# Patient Record
Sex: FEMALE | Race: WHITE | NOT HISPANIC OR LATINO | Employment: FULL TIME | URBAN - METROPOLITAN AREA
[De-identification: names, ages, dates, MRNs, and addresses within clinical notes are randomized per-mention and may not be internally consistent; named-entity substitution may affect disease eponyms.]

---

## 2023-02-16 PROBLEM — J20.9 ACUTE BRONCHITIS: Status: RESOLVED | Noted: 2023-02-16 | Resolved: 2023-02-16

## 2023-02-16 PROBLEM — R92.30 DENSE BREAST TISSUE ON MAMMOGRAM: Status: ACTIVE | Noted: 2023-02-16

## 2023-02-16 PROBLEM — R10.31 ABDOMINAL PAIN, RLQ: Status: RESOLVED | Noted: 2023-02-16 | Resolved: 2023-02-16

## 2023-02-16 PROBLEM — M79.671 RIGHT FOOT PAIN: Status: ACTIVE | Noted: 2023-02-16

## 2023-02-16 PROBLEM — K76.9 LIVER LESION: Status: RESOLVED | Noted: 2023-02-16 | Resolved: 2023-02-16

## 2023-02-16 PROBLEM — K58.9 IRRITABLE BOWEL SYNDROME: Status: ACTIVE | Noted: 2023-02-16

## 2023-02-16 PROBLEM — R92.8 ABNORMAL FINDING ON MAMMOGRAPHY: Status: RESOLVED | Noted: 2023-02-16 | Resolved: 2023-02-16

## 2023-02-16 PROBLEM — J06.9 ACUTE URI: Status: RESOLVED | Noted: 2023-02-16 | Resolved: 2023-02-16

## 2023-02-16 PROBLEM — R05.9 COUGH: Status: RESOLVED | Noted: 2023-02-16 | Resolved: 2023-02-16

## 2023-02-16 PROBLEM — R10.2 PELVIC PAIN: Status: RESOLVED | Noted: 2023-02-16 | Resolved: 2023-02-16

## 2023-02-16 PROBLEM — N92.1 MENOMETRORRHAGIA: Status: RESOLVED | Noted: 2023-02-16 | Resolved: 2023-02-16

## 2023-02-16 PROBLEM — M79.646 PAIN OF FINGER: Status: RESOLVED | Noted: 2023-02-16 | Resolved: 2023-02-16

## 2023-02-16 PROBLEM — N95.2 POSTMENOPAUSAL ATROPHIC VAGINITIS: Status: RESOLVED | Noted: 2023-02-16 | Resolved: 2023-02-16

## 2023-02-16 PROBLEM — N20.0 RENAL CALCULI: Status: RESOLVED | Noted: 2023-02-16 | Resolved: 2023-02-16

## 2023-02-16 PROBLEM — M70.62 TROCHANTERIC BURSITIS OF LEFT HIP: Status: ACTIVE | Noted: 2023-02-16

## 2023-02-16 PROBLEM — N92.1 IRREGULAR INTERMENSTRUAL BLEEDING: Status: RESOLVED | Noted: 2023-02-16 | Resolved: 2023-02-16

## 2023-02-16 PROBLEM — Z98.890 HISTORY OF BENIGN BREAST BIOPSY: Status: RESOLVED | Noted: 2023-02-16 | Resolved: 2023-02-16

## 2023-02-16 PROBLEM — M25.531 RIGHT WRIST PAIN: Status: RESOLVED | Noted: 2023-02-16 | Resolved: 2023-02-16

## 2023-02-16 PROBLEM — J06.9 ACUTE RESPIRATORY DISEASE: Status: RESOLVED | Noted: 2023-02-16 | Resolved: 2023-02-16

## 2023-02-16 PROBLEM — J02.9 PHARYNGITIS: Status: RESOLVED | Noted: 2023-02-16 | Resolved: 2023-02-16

## 2023-02-16 PROBLEM — M79.646 THUMB PAIN: Status: RESOLVED | Noted: 2023-02-16 | Resolved: 2023-02-16

## 2023-02-16 PROBLEM — M41.9 SCOLIOSIS: Status: ACTIVE | Noted: 2023-02-16

## 2023-02-16 PROBLEM — J30.9 ALLERGIC RHINITIS: Status: ACTIVE | Noted: 2023-02-16

## 2023-02-16 PROBLEM — R10.9 ABDOMINAL PAIN: Status: RESOLVED | Noted: 2023-02-16 | Resolved: 2023-02-16

## 2023-02-16 PROBLEM — N92.6 MISSED MENSES: Status: RESOLVED | Noted: 2023-02-16 | Resolved: 2023-02-16

## 2023-02-16 PROBLEM — E55.9 VITAMIN D DEFICIENCY: Status: ACTIVE | Noted: 2023-02-16

## 2023-02-16 PROBLEM — K59.00 CONSTIPATION: Status: RESOLVED | Noted: 2023-02-16 | Resolved: 2023-02-16

## 2023-02-16 PROBLEM — R30.0 DYSURIA: Status: RESOLVED | Noted: 2023-02-16 | Resolved: 2023-02-16

## 2023-02-16 PROBLEM — E78.00 HYPERCHOLESTEREMIA: Status: RESOLVED | Noted: 2023-02-16 | Resolved: 2023-02-16

## 2023-02-16 PROBLEM — R31.9 HEMATURIA: Status: RESOLVED | Noted: 2023-02-16 | Resolved: 2023-02-16

## 2023-02-16 PROBLEM — L30.9 DERMATITIS: Status: RESOLVED | Noted: 2023-02-16 | Resolved: 2023-02-16

## 2023-02-16 PROBLEM — S93.409A GRADE 2 ANKLE SPRAIN: Status: RESOLVED | Noted: 2023-02-16 | Resolved: 2023-02-16

## 2023-02-16 PROBLEM — L20.9 ATOPIC DERMATITIS: Status: RESOLVED | Noted: 2023-02-16 | Resolved: 2023-02-16

## 2023-02-16 PROBLEM — R31.29 MICROSCOPIC HEMATURIA: Status: RESOLVED | Noted: 2023-02-16 | Resolved: 2023-02-16

## 2023-02-16 PROBLEM — N95.1 HOT FLUSHES, PERIMENOPAUSAL: Status: ACTIVE | Noted: 2023-02-16

## 2023-02-16 PROBLEM — M25.562 LEFT KNEE PAIN: Status: RESOLVED | Noted: 2023-02-16 | Resolved: 2023-02-16

## 2023-02-16 PROBLEM — N93.9 ABNORMAL UTERINE BLEEDING (AUB): Status: RESOLVED | Noted: 2023-02-16 | Resolved: 2023-02-16

## 2023-02-16 PROBLEM — R50.9 FEVER OF UNKNOWN ORIGIN: Status: RESOLVED | Noted: 2023-02-16 | Resolved: 2023-02-16

## 2023-02-16 PROBLEM — B37.9 YEAST INFECTION: Status: RESOLVED | Noted: 2023-02-16 | Resolved: 2023-02-16

## 2023-02-16 PROBLEM — M25.561 RIGHT KNEE PAIN: Status: RESOLVED | Noted: 2023-02-16 | Resolved: 2023-02-16

## 2023-02-16 RX ORDER — CHOLECALCIFEROL (VITAMIN D3) 50 MCG
2000 TABLET ORAL DAILY
COMMUNITY

## 2023-03-30 LAB
ALANINE AMINOTRANSFERASE (SGPT) (U/L) IN SER/PLAS: 23 U/L (ref 7–45)
ALBUMIN (G/DL) IN SER/PLAS: 4.3 G/DL (ref 3.4–5)
ALKALINE PHOSPHATASE (U/L) IN SER/PLAS: 105 U/L (ref 33–110)
ANION GAP IN SER/PLAS: 13 MMOL/L (ref 10–20)
ASPARTATE AMINOTRANSFERASE (SGOT) (U/L) IN SER/PLAS: 19 U/L (ref 9–39)
BASOPHILS (10*3/UL) IN BLOOD BY AUTOMATED COUNT: 0.03 X10E9/L (ref 0–0.1)
BASOPHILS/100 LEUKOCYTES IN BLOOD BY AUTOMATED COUNT: 0.8 % (ref 0–2)
BILIRUBIN TOTAL (MG/DL) IN SER/PLAS: 0.5 MG/DL (ref 0–1.2)
CALCIDIOL (25 OH VITAMIN D3) (NG/ML) IN SER/PLAS: 32 NG/ML
CALCIUM (MG/DL) IN SER/PLAS: 9.3 MG/DL (ref 8.6–10.6)
CARBON DIOXIDE, TOTAL (MMOL/L) IN SER/PLAS: 29 MMOL/L (ref 21–32)
CHLORIDE (MMOL/L) IN SER/PLAS: 102 MMOL/L (ref 98–107)
CHOLESTEROL (MG/DL) IN SER/PLAS: 250 MG/DL (ref 0–199)
CHOLESTEROL IN HDL (MG/DL) IN SER/PLAS: 50.2 MG/DL
CHOLESTEROL/HDL RATIO: 5
CREATININE (MG/DL) IN SER/PLAS: 0.72 MG/DL (ref 0.5–1.05)
EOSINOPHILS (10*3/UL) IN BLOOD BY AUTOMATED COUNT: 0.11 X10E9/L (ref 0–0.7)
EOSINOPHILS/100 LEUKOCYTES IN BLOOD BY AUTOMATED COUNT: 2.9 % (ref 0–6)
ERYTHROCYTE DISTRIBUTION WIDTH (RATIO) BY AUTOMATED COUNT: 12.8 % (ref 11.5–14.5)
ERYTHROCYTE MEAN CORPUSCULAR HEMOGLOBIN CONCENTRATION (G/DL) BY AUTOMATED: 31.4 G/DL (ref 32–36)
ERYTHROCYTE MEAN CORPUSCULAR VOLUME (FL) BY AUTOMATED COUNT: 92 FL (ref 80–100)
ERYTHROCYTES (10*6/UL) IN BLOOD BY AUTOMATED COUNT: 4.73 X10E12/L (ref 4–5.2)
ESTIMATED AVERAGE GLUCOSE FOR HBA1C: 103 MG/DL
GFR FEMALE: >90 ML/MIN/1.73M2
GLUCOSE (MG/DL) IN SER/PLAS: 87 MG/DL (ref 74–99)
HEMATOCRIT (%) IN BLOOD BY AUTOMATED COUNT: 43.3 % (ref 36–46)
HEMOGLOBIN (G/DL) IN BLOOD: 13.6 G/DL (ref 12–16)
HEMOGLOBIN A1C/HEMOGLOBIN TOTAL IN BLOOD: 5.2 %
IMMATURE GRANULOCYTES/100 LEUKOCYTES IN BLOOD BY AUTOMATED COUNT: 0.5 % (ref 0–0.9)
LDL: 169 MG/DL (ref 0–99)
LEUKOCYTES (10*3/UL) IN BLOOD BY AUTOMATED COUNT: 3.9 X10E9/L (ref 4.4–11.3)
LYMPHOCYTES (10*3/UL) IN BLOOD BY AUTOMATED COUNT: 1.48 X10E9/L (ref 1.2–4.8)
LYMPHOCYTES/100 LEUKOCYTES IN BLOOD BY AUTOMATED COUNT: 38.4 % (ref 13–44)
MONOCYTES (10*3/UL) IN BLOOD BY AUTOMATED COUNT: 0.3 X10E9/L (ref 0.1–1)
MONOCYTES/100 LEUKOCYTES IN BLOOD BY AUTOMATED COUNT: 7.8 % (ref 2–10)
NEUTROPHILS (10*3/UL) IN BLOOD BY AUTOMATED COUNT: 1.91 X10E9/L (ref 1.2–7.7)
NEUTROPHILS/100 LEUKOCYTES IN BLOOD BY AUTOMATED COUNT: 49.6 % (ref 40–80)
NRBC (PER 100 WBCS) BY AUTOMATED COUNT: 0 /100 WBC (ref 0–0)
PLATELETS (10*3/UL) IN BLOOD AUTOMATED COUNT: 279 X10E9/L (ref 150–450)
POTASSIUM (MMOL/L) IN SER/PLAS: 4.3 MMOL/L (ref 3.5–5.3)
PROTEIN TOTAL: 7.2 G/DL (ref 6.4–8.2)
SODIUM (MMOL/L) IN SER/PLAS: 140 MMOL/L (ref 136–145)
THYROTROPIN (MIU/L) IN SER/PLAS BY DETECTION LIMIT <= 0.05 MIU/L: 2.1 MIU/L (ref 0.44–3.98)
TRIGLYCERIDE (MG/DL) IN SER/PLAS: 154 MG/DL (ref 0–149)
UREA NITROGEN (MG/DL) IN SER/PLAS: 13 MG/DL (ref 6–23)
VLDL: 31 MG/DL (ref 0–40)

## 2023-04-01 ASSESSMENT — PROMIS GLOBAL HEALTH SCALE
RATE_QUALITY_OF_LIFE: GOOD
CARRYOUT_PHYSICAL_ACTIVITIES: COMPLETELY
RATE_MENTAL_HEALTH: GOOD
CARRYOUT_SOCIAL_ACTIVITIES: VERY GOOD
RATE_GENERAL_HEALTH: GOOD
EMOTIONAL_PROBLEMS: OFTEN
RATE_PHYSICAL_HEALTH: GOOD
RATE_AVERAGE_PAIN: 1
RATE_SOCIAL_SATISFACTION: GOOD
RATE_AVERAGE_FATIGUE: MODERATE

## 2023-04-02 PROBLEM — E66.3 OVERWEIGHT WITH BODY MASS INDEX (BMI) OF 29 TO 29.9 IN ADULT: Status: ACTIVE | Noted: 2023-04-02

## 2023-04-02 PROBLEM — R39.9 UTI SYMPTOMS: Status: ACTIVE | Noted: 2023-04-02

## 2023-04-02 PROBLEM — N64.4 BREAST PAIN IN FEMALE: Status: ACTIVE | Noted: 2023-04-02

## 2023-04-02 PROBLEM — R03.0 ELEVATED BLOOD PRESSURE READING: Status: ACTIVE | Noted: 2023-04-02

## 2023-04-02 PROBLEM — F41.8 SITUATIONAL ANXIETY: Status: ACTIVE | Noted: 2023-04-02

## 2023-04-03 ENCOUNTER — OFFICE VISIT (OUTPATIENT)
Dept: PRIMARY CARE | Facility: CLINIC | Age: 53
End: 2023-04-03
Payer: COMMERCIAL

## 2023-04-03 VITALS
HEIGHT: 64 IN | DIASTOLIC BLOOD PRESSURE: 86 MMHG | SYSTOLIC BLOOD PRESSURE: 136 MMHG | RESPIRATION RATE: 16 BRPM | TEMPERATURE: 98.2 F | BODY MASS INDEX: 28.07 KG/M2 | WEIGHT: 164.4 LBS

## 2023-04-03 DIAGNOSIS — E78.00 ELEVATED CHOLESTEROL: ICD-10-CM

## 2023-04-03 DIAGNOSIS — R03.0 ELEVATED BLOOD PRESSURE READING: ICD-10-CM

## 2023-04-03 DIAGNOSIS — Z00.00 ENCOUNTER FOR ANNUAL PHYSICAL EXAM: Primary | ICD-10-CM

## 2023-04-03 DIAGNOSIS — D72.819 LEUKOPENIA, UNSPECIFIED TYPE: ICD-10-CM

## 2023-04-03 DIAGNOSIS — K21.9 GASTROESOPHAGEAL REFLUX DISEASE WITHOUT ESOPHAGITIS: ICD-10-CM

## 2023-04-03 PROCEDURE — 1036F TOBACCO NON-USER: CPT | Performed by: FAMILY MEDICINE

## 2023-04-03 PROCEDURE — 99396 PREV VISIT EST AGE 40-64: CPT | Performed by: FAMILY MEDICINE

## 2023-04-03 NOTE — PROGRESS NOTES
"Subjective   Evi Higgins is a 52 y.o. female who presents for Annual Exam (PT here for CPE ).  HPI  PMH:  NIL neg HPV 3/2022  benign colon polyps 7/2016 rpt 10 yr  mammo-gyn  Shingrix UTD   dtr [facility]     Not ex reguarly d/t going back and forth to [place], dad w CHF hospice    BP much better in the last mo in the 110-130/70-80s. Working on stress mgnt techniques    Upper chest pain after eating no pain w ex    R occ foot pain    Consider vaginal estrogen since sex is painful, lubricant helped some     Current Outpatient Medications on File Prior to Visit   Medication Sig Dispense Refill    cholecalciferol (Vitamin D-3) 50 MCG (2000 UT) tablet Take 1 tablet (2,000 Units) by mouth in the morning.       No current facility-administered medications on file prior to visit.       Objective   /86   Temp 36.8 °C (98.2 °F)   Resp 16   Ht 1.633 m (5' 4.29\")   Wt 74.6 kg (164 lb 6.4 oz)   BMI 27.96 kg/m²    Physical Exam  General: NAD  HEENT:NCAT, PERRLA, nml OP, b/l TM clear, patent nares   Neck: no cervical TOSHIA  Heart: RRR no murmur, no edema   Lungs: CTA b/l, no wheeze or rhonchi   GI: abd soft, nontender, nondistended.   Breast: symmetric, no masses, rashes, lesions, axillary TOSHIA  MSK: no c/c/e. nml gait   Mild pain R outer foot   Skin: warm and dry  Psych: cooperative, appropriate affect  Neuro: speech clear. A&Ox3  Assessment/Plan   Problem List Items Addressed This Visit          Circulatory    Elevated blood pressure reading  -reviewed home readings, at goal. Meds not necessary      Other Visit Diagnoses       Leukopenia, unspecified type    -  Primary  Rpt CBC in 6 wk     Relevant Orders    CBC and Auto Differential    Elevated cholesterol      -ASCVD 2.4 Ca score 0 4/2022  -although lipids have elevated, statin not indicated.   -lifestyle changes  -rpt in 6 mo     Relevant Orders    Lipid Panel    Gastroesophageal reflux disease without esophagitis      -rec OTC PPI and or tums  -red flag signs and return " precautions discussed.       Encounter for annual physical exam    CPE:overall doing well. Discussed diet/ex changes  BMI: 27-lifestyle changes  VACC: reviewed, tdap due this mo will do at f/up appt unless injury occurs. Shingrixx UTD, flu/covid in fall   COLON CA SCRN: due 2026   LABS: reviewed w pt   PAP: UTD rpt 2027   MAMMO: UTD  DEXA: never      R ankle pain: likely d/t previous sprains, rec brace/strengthening

## 2023-05-15 ENCOUNTER — OFFICE VISIT (OUTPATIENT)
Dept: PRIMARY CARE | Facility: CLINIC | Age: 53
End: 2023-05-15
Payer: COMMERCIAL

## 2023-05-15 ENCOUNTER — TELEPHONE (OUTPATIENT)
Dept: PRIMARY CARE | Facility: CLINIC | Age: 53
End: 2023-05-15

## 2023-05-15 VITALS
TEMPERATURE: 99.1 F | OXYGEN SATURATION: 98 % | RESPIRATION RATE: 18 BRPM | SYSTOLIC BLOOD PRESSURE: 132 MMHG | BODY MASS INDEX: 27.93 KG/M2 | DIASTOLIC BLOOD PRESSURE: 81 MMHG | HEART RATE: 60 BPM | WEIGHT: 164.2 LBS

## 2023-05-15 DIAGNOSIS — R35.0 URINARY FREQUENCY: Primary | ICD-10-CM

## 2023-05-15 DIAGNOSIS — J20.8 ACUTE BACTERIAL BRONCHITIS: ICD-10-CM

## 2023-05-15 DIAGNOSIS — B96.89 ACUTE BACTERIAL BRONCHITIS: ICD-10-CM

## 2023-05-15 LAB
POC APPEARANCE, URINE: CLEAR
POC BILIRUBIN, URINE: ABNORMAL
POC BLOOD, URINE: ABNORMAL
POC COLOR, URINE: ABNORMAL
POC GLUCOSE, URINE: NEGATIVE MG/DL
POC KETONES, URINE: NEGATIVE MG/DL
POC LEUKOCYTES, URINE: NEGATIVE
POC NITRITE,URINE: NEGATIVE
POC PH, URINE: 5 PH
POC PROTEIN, URINE: NEGATIVE MG/DL
POC SPECIFIC GRAVITY, URINE: >=1.03
POC UROBILINOGEN, URINE: 0.2 EU/DL

## 2023-05-15 PROCEDURE — 87086 URINE CULTURE/COLONY COUNT: CPT

## 2023-05-15 PROCEDURE — 1036F TOBACCO NON-USER: CPT | Performed by: FAMILY MEDICINE

## 2023-05-15 PROCEDURE — 81002 URINALYSIS NONAUTO W/O SCOPE: CPT | Performed by: FAMILY MEDICINE

## 2023-05-15 PROCEDURE — 99214 OFFICE O/P EST MOD 30 MIN: CPT | Performed by: FAMILY MEDICINE

## 2023-05-15 RX ORDER — AZITHROMYCIN 250 MG/1
TABLET, FILM COATED ORAL
Qty: 6 TABLET | Refills: 0 | Status: SHIPPED | OUTPATIENT
Start: 2023-05-15 | End: 2023-05-20

## 2023-05-15 RX ORDER — AMOXICILLIN 500 MG/1
CAPSULE ORAL
COMMUNITY
Start: 2023-05-09 | End: 2023-10-11 | Stop reason: ALTCHOICE

## 2023-05-15 NOTE — TELEPHONE ENCOUNTER
Patient called in sts she just got back from [place] was seen at the  there and was tested for Flu and covid both negative, on amoxicillin sts she doesn't think its working her chest is feeling heavy possible walking Pneumonia has had before low grade fever wants to be seen today? Please advise

## 2023-05-15 NOTE — PROGRESS NOTES
"Subjective   Evi Higgins is a 52 y.o. female who presents for URI (Pt experiencing cold symptoms; [facility] tested for Flu and covid both negative, on amoxicillin; heaviness in chest; low grade fever).  HPI  On amoxil x 1 wk for sinusitis, started in [place] when out of town. Hx walking PNA x 2 in life. Cough is \"wet\" no wheeze or sob. Occ chest hurting and feels hot. No runny nose. Occ ear pressure. Temp around 99 but \"feels hot\".     Has inc urinary freq after driving 12 hr from [place]. No itching. No DC. No dysuria. Slightly better today  Current Outpatient Medications on File Prior to Visit   Medication Sig Dispense Refill    amoxicillin (Amoxil) 500 mg capsule TAKE 1 CAPSULE (500 MG TOTAL) BY MOUTH EVERY 12 HOURS FOR 7 DAYS      cholecalciferol (Vitamin D-3) 50 MCG (2000 UT) tablet Take 1 tablet (2,000 Units) by mouth in the morning.       No current facility-administered medications on file prior to visit.                  Objective   /81   Pulse 60   Temp 37.3 °C (99.1 °F)   Resp 18   Wt 74.5 kg (164 lb 3.2 oz)   SpO2 98%   BMI 27.93 kg/m²    Physical Exam  General: NAD  HEENT:NCAT, PERRLA, nml OP  Neck: no cervical TOSHIA  Heart: RRR no murmur, no edema   Lungs: CTA b/l, no wheeze or rhonchi   GI: abd soft, nontender, nondistended.   MSK: no c/c/e. nml gait   Skin: warm and dry  Psych: cooperative, appropriate affect  Neuro: speech clear. A&Ox3  Assessment/Plan   Problem List Items Addressed This Visit    None  Visit Diagnoses       Urinary frequency    -  Primary  -blood in urine noted. Likely chronic benign micro hematuria. Saw uro in 2021 w some w/up. Never had cyst  -check micro and culture given her symptoms   -CT 2021 reviewed, no stones   Results for orders placed or performed in visit on 05/15/23 (from the past 24 hour(s))   POCT UA (nonautomated) manually resulted   Result Value Ref Range    POC Color, Urine Straw Straw, Yellow, Light Yellow    POC Appearance, Urine Clear Clear    POC Specific " Gravity, Urine >=1.030 1.005 - 1.035    POC PH, Urine 5.0 No Reference Range Established PH    POC Protein, Urine NEGATIVE NEGATIVE, 30 (1+) mg/dl    POC Glucose, Urine NEGATIVE NEGATIVE mg/dl    POC Blood, Urine MODERATE (2+) (A) NEGATIVE    POC Ketones, Urine NEGATIVE NEGATIVE mg/dl    POC Bilirubin, Urine SMALL (1+) (A) NEGATIVE    POC Urobilinogen, Urine 0.2 0.2, 1.0 EU/DL    Poc Nitrate, Urine NEGATIVE NEGATIVE    POC Leukocytes, Urine NEGATIVE NEGATIVE         Relevant Orders    Urinalysis with Reflex Microscopic and Culture    Urine Culture    POCT UA (nonautomated) manually resulted    Acute bacterial bronchitis      -failed amoxil  -start zpack  -if no improvement CXR w medrol     Relevant Medications    azithromycin (Zithromax) 250 mg tablet

## 2023-05-16 LAB — URINE CULTURE: NORMAL

## 2023-05-17 ENCOUNTER — TELEPHONE (OUTPATIENT)
Dept: PRIMARY CARE | Facility: CLINIC | Age: 53
End: 2023-05-17
Payer: COMMERCIAL

## 2023-05-17 NOTE — TELEPHONE ENCOUNTER
Patient called and sts that her Lab orders are still not covered properly do to the correct coding her Vit D was fixed  but not her Thyroid still needs to be re-coded? Please call her she also sts that her daughters labs from lat yr 2022 need to be done as well

## 2023-07-10 ENCOUNTER — APPOINTMENT (OUTPATIENT)
Dept: LAB | Facility: LAB | Age: 53
End: 2023-07-10
Payer: COMMERCIAL

## 2023-07-10 LAB
BASOPHILS (10*3/UL) IN BLOOD BY AUTOMATED COUNT: 0.04 X10E9/L (ref 0–0.1)
BASOPHILS/100 LEUKOCYTES IN BLOOD BY AUTOMATED COUNT: 0.7 % (ref 0–2)
EOSINOPHILS (10*3/UL) IN BLOOD BY AUTOMATED COUNT: 0.06 X10E9/L (ref 0–0.7)
EOSINOPHILS/100 LEUKOCYTES IN BLOOD BY AUTOMATED COUNT: 1.1 % (ref 0–6)
ERYTHROCYTE DISTRIBUTION WIDTH (RATIO) BY AUTOMATED COUNT: 13.1 % (ref 11.5–14.5)
ERYTHROCYTE MEAN CORPUSCULAR HEMOGLOBIN CONCENTRATION (G/DL) BY AUTOMATED: 32.7 G/DL (ref 32–36)
ERYTHROCYTE MEAN CORPUSCULAR VOLUME (FL) BY AUTOMATED COUNT: 90 FL (ref 80–100)
ERYTHROCYTES (10*6/UL) IN BLOOD BY AUTOMATED COUNT: 4.31 X10E12/L (ref 4–5.2)
HEMATOCRIT (%) IN BLOOD BY AUTOMATED COUNT: 38.8 % (ref 36–46)
HEMOGLOBIN (G/DL) IN BLOOD: 12.7 G/DL (ref 12–16)
IMMATURE GRANULOCYTES/100 LEUKOCYTES IN BLOOD BY AUTOMATED COUNT: 0.5 % (ref 0–0.9)
LEUKOCYTES (10*3/UL) IN BLOOD BY AUTOMATED COUNT: 5.5 X10E9/L (ref 4.4–11.3)
LYMPHOCYTES (10*3/UL) IN BLOOD BY AUTOMATED COUNT: 1.74 X10E9/L (ref 1.2–4.8)
LYMPHOCYTES/100 LEUKOCYTES IN BLOOD BY AUTOMATED COUNT: 31.6 % (ref 13–44)
MONOCYTES (10*3/UL) IN BLOOD BY AUTOMATED COUNT: 0.4 X10E9/L (ref 0.1–1)
MONOCYTES/100 LEUKOCYTES IN BLOOD BY AUTOMATED COUNT: 7.3 % (ref 2–10)
NEUTROPHILS (10*3/UL) IN BLOOD BY AUTOMATED COUNT: 3.23 X10E9/L (ref 1.2–7.7)
NEUTROPHILS/100 LEUKOCYTES IN BLOOD BY AUTOMATED COUNT: 58.8 % (ref 40–80)
NRBC (PER 100 WBCS) BY AUTOMATED COUNT: 0 /100 WBC (ref 0–0)
PLATELETS (10*3/UL) IN BLOOD AUTOMATED COUNT: 281 X10E9/L (ref 150–450)

## 2023-10-07 ENCOUNTER — LAB (OUTPATIENT)
Dept: LAB | Facility: LAB | Age: 53
End: 2023-10-07
Payer: COMMERCIAL

## 2023-10-07 DIAGNOSIS — D72.819 LEUKOPENIA, UNSPECIFIED TYPE: ICD-10-CM

## 2023-10-07 LAB
BASOPHILS # BLD AUTO: 0.03 X10*3/UL (ref 0–0.1)
BASOPHILS NFR BLD AUTO: 0.7 %
EOSINOPHIL # BLD AUTO: 0.11 X10*3/UL (ref 0–0.7)
EOSINOPHIL NFR BLD AUTO: 2.5 %
ERYTHROCYTE [DISTWIDTH] IN BLOOD BY AUTOMATED COUNT: 12.6 % (ref 11.5–14.5)
HCT VFR BLD AUTO: 43.2 % (ref 36–46)
HGB BLD-MCNC: 13.7 G/DL (ref 12–16)
IMM GRANULOCYTES # BLD AUTO: 0.04 X10*3/UL (ref 0–0.7)
IMM GRANULOCYTES NFR BLD AUTO: 0.9 % (ref 0–0.9)
LYMPHOCYTES # BLD AUTO: 1.63 X10*3/UL (ref 1.2–4.8)
LYMPHOCYTES NFR BLD AUTO: 36.7 %
MCH RBC QN AUTO: 29.3 PG (ref 26–34)
MCHC RBC AUTO-ENTMCNC: 31.7 G/DL (ref 32–36)
MCV RBC AUTO: 93 FL (ref 80–100)
MONOCYTES # BLD AUTO: 0.32 X10*3/UL (ref 0.1–1)
MONOCYTES NFR BLD AUTO: 7.2 %
NEUTROPHILS # BLD AUTO: 2.31 X10*3/UL (ref 1.2–7.7)
NEUTROPHILS NFR BLD AUTO: 52 %
NRBC BLD-RTO: 0 /100 WBCS (ref 0–0)
PLATELET # BLD AUTO: 269 X10*3/UL (ref 150–450)
PMV BLD AUTO: 11.7 FL (ref 7.5–11.5)
RBC # BLD AUTO: 4.67 X10*6/UL (ref 4–5.2)
WBC # BLD AUTO: 4.4 X10*3/UL (ref 4.4–11.3)

## 2023-10-07 PROCEDURE — 36415 COLL VENOUS BLD VENIPUNCTURE: CPT

## 2023-10-07 PROCEDURE — 85025 COMPLETE CBC W/AUTO DIFF WBC: CPT

## 2023-10-11 ENCOUNTER — LAB (OUTPATIENT)
Dept: LAB | Facility: LAB | Age: 53
End: 2023-10-11
Payer: COMMERCIAL

## 2023-10-11 ENCOUNTER — TELEPHONE (OUTPATIENT)
Dept: PRIMARY CARE | Facility: CLINIC | Age: 53
End: 2023-10-11

## 2023-10-11 ENCOUNTER — OFFICE VISIT (OUTPATIENT)
Dept: PRIMARY CARE | Facility: CLINIC | Age: 53
End: 2023-10-11
Payer: COMMERCIAL

## 2023-10-11 VITALS
OXYGEN SATURATION: 96 % | DIASTOLIC BLOOD PRESSURE: 86 MMHG | TEMPERATURE: 98.2 F | BODY MASS INDEX: 27.49 KG/M2 | HEIGHT: 64 IN | SYSTOLIC BLOOD PRESSURE: 131 MMHG | WEIGHT: 161 LBS | RESPIRATION RATE: 18 BRPM | HEART RATE: 61 BPM

## 2023-10-11 DIAGNOSIS — Z23 NEED FOR DIPHTHERIA-TETANUS-PERTUSSIS (TDAP) VACCINE: ICD-10-CM

## 2023-10-11 DIAGNOSIS — Z00.00 ANNUAL PHYSICAL EXAM: ICD-10-CM

## 2023-10-11 DIAGNOSIS — E78.2 MIXED HYPERLIPIDEMIA: Primary | ICD-10-CM

## 2023-10-11 LAB
CHOLEST SERPL-MCNC: 239 MG/DL (ref 0–199)
CHOLESTEROL/HDL RATIO: 4.6
HDLC SERPL-MCNC: 52.2 MG/DL
LDLC SERPL CALC-MCNC: 155 MG/DL (ref 140–190)
NON HDL CHOLESTEROL: 187 MG/DL (ref 0–149)
TRIGL SERPL-MCNC: 161 MG/DL (ref 0–149)
VLDL: 32 MG/DL (ref 0–40)

## 2023-10-11 PROCEDURE — 90471 IMMUNIZATION ADMIN: CPT | Performed by: FAMILY MEDICINE

## 2023-10-11 PROCEDURE — 80061 LIPID PANEL: CPT

## 2023-10-11 PROCEDURE — 99213 OFFICE O/P EST LOW 20 MIN: CPT | Performed by: FAMILY MEDICINE

## 2023-10-11 PROCEDURE — 90715 TDAP VACCINE 7 YRS/> IM: CPT | Performed by: FAMILY MEDICINE

## 2023-10-11 PROCEDURE — 36415 COLL VENOUS BLD VENIPUNCTURE: CPT

## 2023-10-11 PROCEDURE — 1036F TOBACCO NON-USER: CPT | Performed by: FAMILY MEDICINE

## 2023-10-11 NOTE — TELEPHONE ENCOUNTER
Her WBC is improved which was what I wanted to see  Other minor changes are not clinically significant and do not warrant further eval since her other major blood values (hemogloblin, WBC and platelets are normal)

## 2023-10-11 NOTE — TELEPHONE ENCOUNTER
Patient notified of positive UA culture, E.coli,  results.Name and  verified. Advised patient to continues taking cephalexin as prescribed. Patient verbalized understanding of results and to follow up with PCP if necessary.     Component      Latest Ref Rng & Units 10/26/2022   REFLEXIVE URINE CULTURE       >100,000 CFU/mL Escherichia coli (A)      VM:  pt returned your call 2:15pm

## 2023-10-11 NOTE — PROGRESS NOTES
"Subjective   Evi Higgins is a 52 y.o. female who presents for Follow-up (Six month follow up 6 mo HLD, tdap, with labs).  HPI  Here for f/up   Dad passed in Aug and was able to be w him in [place] on and off for 8 mo. Doing well overall but have waves of grief.     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 Walking. Appetite has inc and gained a few lbs.   Back to sub teaching     Current Outpatient Medications on File Prior to Visit   Medication Sig Dispense Refill    cholecalciferol (Vitamin D-3) 50 MCG (2000 UT) tablet Take 1 tablet (2,000 Units) by mouth in the morning.      [DISCONTINUED] amoxicillin (Amoxil) 500 mg capsule TAKE 1 CAPSULE (500 MG TOTAL) BY MOUTH EVERY 12 HOURS FOR 7 DAYS       No current facility-administered medications on file prior to visit.                  Objective   /86   Pulse 61   Temp 36.8 °C (98.2 °F)   Resp 18   Ht 1.633 m (5' 4.29\")   Wt 73 kg (161 lb)   SpO2 96%   BMI 27.39 kg/m²    Physical Exam  General: NAD  HEENT:NCAT, PERRLA, nml OP  Neck: no cervical TOSHIA  Heart: RRR no murmur, no edema   Lungs: CTA b/l, no wheeze or rhonchi   GI: abd soft, nontender, nondistended.   MSK: no c/c/e. nml gait   Skin: warm and dry  Psych: cooperative, appropriate affect  Neuro: speech clear. A&Ox3  Assessment/Plan   Problem List Items Addressed This Visit    None  Visit Diagnoses       Mixed hyperlipidemia    -  Primary  -borderline lipids from April   -has been making lifestyle changes but in the last few weeks less diligent  -rpt FLP today  -Ca score 0 2022 and ASCVD 2.4  -unless a significant inc in lipids will hold off on statin   -f/up CPE 6 mo or prn   -tdap today  -flu/covid at pharm     Need for diphtheria-tetanus-pertussis (Tdap) vaccine        Relevant Orders    Tdap vaccine, age 7 years and older (Completed)    Annual physical exam        Relevant Orders    Lipid Panel            "

## 2023-10-31 ENCOUNTER — APPOINTMENT (OUTPATIENT)
Dept: PRIMARY CARE | Facility: CLINIC | Age: 53
End: 2023-10-31
Payer: COMMERCIAL

## 2024-03-25 NOTE — PROGRESS NOTES
PAP 3-7-22 NEG, HPV NEG  MAMMO 3-29-23  DEXA YRS AGO  COLON 16 Normal - 10 yrs.      ASSESSMENT/PLAN    Encounter for well woman exam with routine gynecological exam  Normal routine well woman exam.   No pap done. Last pap  was normal.    Encounter for screening mammogram for breast cancer  - BI mammo bilateral screening tomosynthesis; Future     Screening for osteoporosis.  Bone density requisition placed.    SUBJECTIVE    HPI    52-year-old here for routine well woman visit.   Last visit 2023 for breast symptoms.  Menopausal for two years.   Asks about intravaginal estrogen. Friends are using it. We talked about vaginal lubricants in past. Pt never tried. We reviewed intravaginal estrogen options.   Dad  August after a long illness. She was going back and forth to [place]. Pt feels like she had time to spend with him and say her goodbyes.  Pt doing well. Exercising boot camp 4 times/week.   , no smoke, no ETOH.  2 kids. One is a Sr at [employer], graduating this year. . Other one working at [employer], plans grad school at [employer].       Review of Systems    Constitutional: no fever, no chills, no recent weight gain, no recent weight loss and no fatigue.   Eyes: no eye pain, no vision problems and no dryness of the eyes.   ENT: no hearing loss, no nosebleeds and no sinus congestion.   Cardiovascular: no chest pain, no palpitations and no orthopnea.   Respiratory: no shortness of breath, no cough and no wheezing.   Gastrointestinal: no abdominal pain, no constipation, no nausea, no diarrhea and no vomiting.   Genitourinary: no pelvic pain, no dysuria, no urinary incontinence, no vaginal dryness, no vaginal itching, no dyspareunia, no dysmenorrhea, no sexual problems, no change in urinary frequency, no vaginal discharge, no unexplained vaginal bleeding and no lesion/sore.   Musculoskeletal: no back pain, no joint swelling and no leg edema.   Integumentary: no rashes, no skin lesions, no breast pain, no  "nipple discharge and no breast lump.   Neurological: no headache, no numbness and no dizziness.   Psychiatric: no sleep disturbances, no anxiety and no depression.   Endocrine: no hot flashes, no loss of hair and no hirsutism.   Hematologic/Lymphatic: no swollen glands, no tendency for easy bleeding and no tendency for easy bruising.      OBJECTIVE    /70   Ht 1.651 m (5' 5\")   Wt 74.4 kg (164 lb)   LMP 04/01/2022   BMI 27.29 kg/m²      Physical Exam     Constitutional: Alert and in no acute distress. Well developed, well nourished   Head and Face: Head and face: normal   Eyes: Normal external exam - nonicteric sclera, extraocular movements intact (EOMI) and no ptosis.   Ears, Nose, Mouth, and Throat: External inspection of ears and nose: normal   Neck: no neck asymmetry. Supple and thyroid not enlarged and there were no palpable thyroid nodules   Cardiovascular: Heart rate and rhythm were normal, normal S1 and S2, no gallops, and no murmurs   Pulmonary: No respiratory distress and clear bilateral breath sounds   Chest: Breasts: normal appearance, no nipple discharge and no skin changes and palpation of breasts and axillae: no palpable mass and no axillary lymphadenopathy   Abdomen: soft nontender; no abdominal mass palpated, no organomegaly and no hernias   Genitourinary: external genitalia: normal, no inguinal lymphadenopathy, Bartholin's urethral and [place]'s glands: normal, urethra: normal, bladder: normal on palpation and perianal area: normal   Vagina: normal.   Cervix: Normal appearing without lesions.  Uterus: Normal, mobile, nontender.  Right Adnexa/parametria: Normal.   Left Adnexa/parametria: Normal.   Skin: normal skin color and pigmentation, normal skin turgor, and no rash.   Psychiatric: alert and oriented x 3., affect normal to patient baseline and mood: appropriate          Juliann Sheldon MD    "

## 2024-03-26 ENCOUNTER — OFFICE VISIT (OUTPATIENT)
Dept: OBSTETRICS AND GYNECOLOGY | Facility: CLINIC | Age: 54
End: 2024-03-26
Payer: COMMERCIAL

## 2024-03-26 VITALS
WEIGHT: 164 LBS | HEIGHT: 65 IN | BODY MASS INDEX: 27.32 KG/M2 | DIASTOLIC BLOOD PRESSURE: 70 MMHG | SYSTOLIC BLOOD PRESSURE: 128 MMHG

## 2024-03-26 DIAGNOSIS — Z12.31 ENCOUNTER FOR SCREENING MAMMOGRAM FOR BREAST CANCER: ICD-10-CM

## 2024-03-26 DIAGNOSIS — Z13.820 SCREENING FOR OSTEOPOROSIS: ICD-10-CM

## 2024-03-26 DIAGNOSIS — Z78.0 ASYMPTOMATIC POSTMENOPAUSAL STATE: ICD-10-CM

## 2024-03-26 DIAGNOSIS — Z01.419 ENCOUNTER FOR WELL WOMAN EXAM WITH ROUTINE GYNECOLOGICAL EXAM: Primary | ICD-10-CM

## 2024-03-26 PROCEDURE — 99396 PREV VISIT EST AGE 40-64: CPT | Performed by: OBSTETRICS & GYNECOLOGY

## 2024-03-26 PROCEDURE — 1036F TOBACCO NON-USER: CPT | Performed by: OBSTETRICS & GYNECOLOGY

## 2024-04-01 ENCOUNTER — LAB (OUTPATIENT)
Dept: LAB | Facility: LAB | Age: 54
End: 2024-04-01
Payer: COMMERCIAL

## 2024-04-01 DIAGNOSIS — Z00.00 ANNUAL PHYSICAL EXAM: ICD-10-CM

## 2024-04-01 DIAGNOSIS — R79.89 LOW VITAMIN D LEVEL: ICD-10-CM

## 2024-04-01 LAB
ALBUMIN SERPL BCP-MCNC: 4.3 G/DL (ref 3.4–5)
ALP SERPL-CCNC: 95 U/L (ref 33–110)
ALT SERPL W P-5'-P-CCNC: 18 U/L (ref 7–45)
ANION GAP SERPL CALC-SCNC: 14 MMOL/L (ref 10–20)
AST SERPL W P-5'-P-CCNC: 16 U/L (ref 9–39)
BASOPHILS # BLD AUTO: 0.04 X10*3/UL (ref 0–0.1)
BASOPHILS NFR BLD AUTO: 1.1 %
BILIRUB SERPL-MCNC: 0.5 MG/DL (ref 0–1.2)
BUN SERPL-MCNC: 18 MG/DL (ref 6–23)
CALCIUM SERPL-MCNC: 9.5 MG/DL (ref 8.6–10.6)
CHLORIDE SERPL-SCNC: 103 MMOL/L (ref 98–107)
CHOLEST SERPL-MCNC: 218 MG/DL (ref 0–199)
CHOLESTEROL/HDL RATIO: 4.8
CO2 SERPL-SCNC: 28 MMOL/L (ref 21–32)
CREAT SERPL-MCNC: 0.73 MG/DL (ref 0.5–1.05)
EGFRCR SERPLBLD CKD-EPI 2021: >90 ML/MIN/1.73M*2
EOSINOPHIL # BLD AUTO: 0.13 X10*3/UL (ref 0–0.7)
EOSINOPHIL NFR BLD AUTO: 3.4 %
ERYTHROCYTE [DISTWIDTH] IN BLOOD BY AUTOMATED COUNT: 12.8 % (ref 11.5–14.5)
EST. AVERAGE GLUCOSE BLD GHB EST-MCNC: 103 MG/DL
GLUCOSE SERPL-MCNC: 91 MG/DL (ref 74–99)
HBA1C MFR BLD: 5.2 %
HCT VFR BLD AUTO: 43.9 % (ref 36–46)
HDLC SERPL-MCNC: 45 MG/DL
HGB BLD-MCNC: 14.2 G/DL (ref 12–16)
IMM GRANULOCYTES # BLD AUTO: 0.02 X10*3/UL (ref 0–0.7)
IMM GRANULOCYTES NFR BLD AUTO: 0.5 % (ref 0–0.9)
LDLC SERPL CALC-MCNC: 123 MG/DL
LYMPHOCYTES # BLD AUTO: 1.64 X10*3/UL (ref 1.2–4.8)
LYMPHOCYTES NFR BLD AUTO: 43.4 %
MCH RBC QN AUTO: 28.9 PG (ref 26–34)
MCHC RBC AUTO-ENTMCNC: 32.3 G/DL (ref 32–36)
MCV RBC AUTO: 89 FL (ref 80–100)
MONOCYTES # BLD AUTO: 0.23 X10*3/UL (ref 0.1–1)
MONOCYTES NFR BLD AUTO: 6.1 %
NEUTROPHILS # BLD AUTO: 1.72 X10*3/UL (ref 1.2–7.7)
NEUTROPHILS NFR BLD AUTO: 45.5 %
NON HDL CHOLESTEROL: 173 MG/DL (ref 0–149)
NRBC BLD-RTO: 0 /100 WBCS (ref 0–0)
PLATELET # BLD AUTO: 277 X10*3/UL (ref 150–450)
POTASSIUM SERPL-SCNC: 4.1 MMOL/L (ref 3.5–5.3)
PROT SERPL-MCNC: 7.1 G/DL (ref 6.4–8.2)
RBC # BLD AUTO: 4.91 X10*6/UL (ref 4–5.2)
SODIUM SERPL-SCNC: 141 MMOL/L (ref 136–145)
TRIGL SERPL-MCNC: 251 MG/DL (ref 0–149)
TSH SERPL-ACNC: 2.12 MIU/L (ref 0.44–3.98)
VLDL: 50 MG/DL (ref 0–40)
WBC # BLD AUTO: 3.8 X10*3/UL (ref 4.4–11.3)

## 2024-04-01 PROCEDURE — 84443 ASSAY THYROID STIM HORMONE: CPT

## 2024-04-01 PROCEDURE — 80053 COMPREHEN METABOLIC PANEL: CPT

## 2024-04-01 PROCEDURE — 85025 COMPLETE CBC W/AUTO DIFF WBC: CPT

## 2024-04-01 PROCEDURE — 83036 HEMOGLOBIN GLYCOSYLATED A1C: CPT

## 2024-04-01 PROCEDURE — 82306 VITAMIN D 25 HYDROXY: CPT

## 2024-04-01 PROCEDURE — 36415 COLL VENOUS BLD VENIPUNCTURE: CPT

## 2024-04-01 PROCEDURE — 80061 LIPID PANEL: CPT

## 2024-04-03 ENCOUNTER — TELEPHONE (OUTPATIENT)
Dept: OBSTETRICS AND GYNECOLOGY | Facility: CLINIC | Age: 54
End: 2024-04-03
Payer: COMMERCIAL

## 2024-04-04 ENCOUNTER — HOSPITAL ENCOUNTER (OUTPATIENT)
Dept: RADIOLOGY | Facility: CLINIC | Age: 54
Discharge: HOME | End: 2024-04-04
Payer: COMMERCIAL

## 2024-04-04 ENCOUNTER — OFFICE VISIT (OUTPATIENT)
Dept: PRIMARY CARE | Facility: CLINIC | Age: 54
End: 2024-04-04
Payer: COMMERCIAL

## 2024-04-04 VITALS
TEMPERATURE: 97.7 F | DIASTOLIC BLOOD PRESSURE: 88 MMHG | OXYGEN SATURATION: 96 % | WEIGHT: 167 LBS | RESPIRATION RATE: 16 BRPM | SYSTOLIC BLOOD PRESSURE: 128 MMHG | HEART RATE: 69 BPM | BODY MASS INDEX: 27.82 KG/M2 | HEIGHT: 65 IN

## 2024-04-04 DIAGNOSIS — R79.89 LOW VITAMIN D LEVEL: ICD-10-CM

## 2024-04-04 DIAGNOSIS — Z12.31 ENCOUNTER FOR SCREENING MAMMOGRAM FOR BREAST CANCER: ICD-10-CM

## 2024-04-04 DIAGNOSIS — Z78.0 ASYMPTOMATIC POSTMENOPAUSAL STATE: ICD-10-CM

## 2024-04-04 DIAGNOSIS — D72.819 LEUKOPENIA, UNSPECIFIED TYPE: ICD-10-CM

## 2024-04-04 DIAGNOSIS — Z00.00 ENCOUNTER FOR ANNUAL PHYSICAL EXAM: Primary | ICD-10-CM

## 2024-04-04 DIAGNOSIS — E78.2 MIXED HYPERLIPIDEMIA: ICD-10-CM

## 2024-04-04 DIAGNOSIS — Z13.820 SCREENING FOR OSTEOPOROSIS: ICD-10-CM

## 2024-04-04 DIAGNOSIS — N95.2 ATROPHIC VAGINITIS: Primary | ICD-10-CM

## 2024-04-04 LAB — 25(OH)D3 SERPL-MCNC: 25 NG/ML (ref 30–100)

## 2024-04-04 PROCEDURE — 77067 SCR MAMMO BI INCL CAD: CPT

## 2024-04-04 PROCEDURE — 77063 BREAST TOMOSYNTHESIS BI: CPT | Performed by: STUDENT IN AN ORGANIZED HEALTH CARE EDUCATION/TRAINING PROGRAM

## 2024-04-04 PROCEDURE — 1036F TOBACCO NON-USER: CPT | Performed by: FAMILY MEDICINE

## 2024-04-04 PROCEDURE — 77080 DXA BONE DENSITY AXIAL: CPT | Performed by: RADIOLOGY

## 2024-04-04 PROCEDURE — 77067 SCR MAMMO BI INCL CAD: CPT | Performed by: STUDENT IN AN ORGANIZED HEALTH CARE EDUCATION/TRAINING PROGRAM

## 2024-04-04 PROCEDURE — 99396 PREV VISIT EST AGE 40-64: CPT | Performed by: FAMILY MEDICINE

## 2024-04-04 PROCEDURE — 77080 DXA BONE DENSITY AXIAL: CPT

## 2024-04-04 RX ORDER — ESTRADIOL 4 UG/1
4 INSERT VAGINAL DAILY
Qty: 1 EACH | Refills: 0 | Status: SHIPPED | OUTPATIENT
Start: 2024-04-04

## 2024-04-04 NOTE — PROGRESS NOTES
"Subjective   Evi Higgins is a 53 y.o. female who presents for Follow-up, Annual Exam (CPE), and vaginal dryness.  HPI  PMH:  NIL neg HPV 3/2022  benign colon polyps 7/2016 rpt 10 yr  mammo-gyn  Shingrix UTD   Ca score 0 4/2022     Here for CPE     Has been ex 5 d a week and feels great. Clothes are looser not eating heavy meals     Less time in [place] since dad passed, stays w mom every other mo for 6 d. Better bhavna for her     2 year since LMP. Feels hot at times    Dr Sheldon Rx imvexxy for vaginal dryness     Dexa/mammo bhavna today     Fhx MGUS WBC has fluctuated in past     Current Outpatient Medications on File Prior to Visit   Medication Sig Dispense Refill    cholecalciferol (Vitamin D-3) 50 MCG (2000 UT) tablet Take 1 tablet (2,000 Units) by mouth in the morning.       No current facility-administered medications on file prior to visit.                  Objective   /88 (BP Location: Left arm)   Pulse 69   Temp 36.5 °C (97.7 °F)   Resp 16   Ht 1.651 m (5' 5\")   Wt 75.8 kg (167 lb)   LMP 04/01/2022   SpO2 96%   BMI 27.79 kg/m²    Physical Exam  General: NAD  HEENT:NCAT, PERRLA, nml OP, b/l TM clear   Neck: no cervical TOSHIA  Heart: RRR no murmur, no edema   Lungs: CTA b/l, no wheeze or rhonchi   GI: abd soft, nontender, nondistended.   MSK: no c/c/e. nml gait   Skin: warm and dry  Psych: cooperative, appropriate affect  Neuro: speech clear. A&Ox3  Assessment/Plan   Problem List Items Addressed This Visit    None  Visit Diagnoses       Encounter for annual physical exam    -  Primary  CPE:overall doing well. 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 TG elevated will work on dec sugar  ASCVD 2.4 Ca score 0 2022  Statin not indicated

## 2024-04-08 ENCOUNTER — TELEPHONE (OUTPATIENT)
Dept: OBSTETRICS AND GYNECOLOGY | Facility: CLINIC | Age: 54
End: 2024-04-08
Payer: COMMERCIAL

## 2024-05-18 ENCOUNTER — LAB (OUTPATIENT)
Dept: LAB | Facility: LAB | Age: 54
End: 2024-05-18
Payer: COMMERCIAL

## 2024-05-18 DIAGNOSIS — R79.89 LOW VITAMIN D LEVEL: ICD-10-CM

## 2024-05-18 DIAGNOSIS — D72.819 LEUKOPENIA, UNSPECIFIED TYPE: ICD-10-CM

## 2024-05-18 PROCEDURE — 85025 COMPLETE CBC W/AUTO DIFF WBC: CPT

## 2024-05-18 PROCEDURE — 36415 COLL VENOUS BLD VENIPUNCTURE: CPT

## 2024-05-18 PROCEDURE — 82306 VITAMIN D 25 HYDROXY: CPT

## 2024-05-19 LAB
25(OH)D3 SERPL-MCNC: 40 NG/ML (ref 30–100)
BASOPHILS # BLD AUTO: 0.04 X10*3/UL (ref 0–0.1)
BASOPHILS NFR BLD AUTO: 1 %
EOSINOPHIL # BLD AUTO: 0.17 X10*3/UL (ref 0–0.7)
EOSINOPHIL NFR BLD AUTO: 4.3 %
ERYTHROCYTE [DISTWIDTH] IN BLOOD BY AUTOMATED COUNT: 12.8 % (ref 11.5–14.5)
HCT VFR BLD AUTO: 42.6 % (ref 36–46)
HGB BLD-MCNC: 13.7 G/DL (ref 12–16)
IMM GRANULOCYTES # BLD AUTO: 0.01 X10*3/UL (ref 0–0.7)
IMM GRANULOCYTES NFR BLD AUTO: 0.3 % (ref 0–0.9)
LYMPHOCYTES # BLD AUTO: 1.56 X10*3/UL (ref 1.2–4.8)
LYMPHOCYTES NFR BLD AUTO: 39.8 %
MCH RBC QN AUTO: 29.5 PG (ref 26–34)
MCHC RBC AUTO-ENTMCNC: 32.2 G/DL (ref 32–36)
MCV RBC AUTO: 92 FL (ref 80–100)
MONOCYTES # BLD AUTO: 0.27 X10*3/UL (ref 0.1–1)
MONOCYTES NFR BLD AUTO: 6.9 %
NEUTROPHILS # BLD AUTO: 1.87 X10*3/UL (ref 1.2–7.7)
NEUTROPHILS NFR BLD AUTO: 47.7 %
NRBC BLD-RTO: 0 /100 WBCS (ref 0–0)
PLATELET # BLD AUTO: 269 X10*3/UL (ref 150–450)
RBC # BLD AUTO: 4.65 X10*6/UL (ref 4–5.2)
WBC # BLD AUTO: 3.9 X10*3/UL (ref 4.4–11.3)

## 2024-05-28 ENCOUNTER — TELEPHONE (OUTPATIENT)
Dept: OBSTETRICS AND GYNECOLOGY | Facility: CLINIC | Age: 54
End: 2024-05-28
Payer: COMMERCIAL

## 2024-05-28 DIAGNOSIS — R39.15 URINARY URGENCY: Primary | ICD-10-CM

## 2024-05-28 DIAGNOSIS — R10.2 PELVIC PAIN: ICD-10-CM

## 2024-05-28 NOTE — PROGRESS NOTES
TC with pt.  Started imvexxy starter pack.   Recommend just inserting imvexxy twice weekly and ignore the starter pack instructions.  Pt with long standing history of urinary urgency, frequency. Is getting worse. Has a friend who does pelvic floor PT for same sx. Pt requests referral. Pelvic floor PT order placed in [facility].

## 2024-07-23 ENCOUNTER — EVALUATION (OUTPATIENT)
Dept: PHYSICAL THERAPY | Facility: CLINIC | Age: 54
End: 2024-07-23
Payer: COMMERCIAL

## 2024-07-23 DIAGNOSIS — R39.15 URINARY URGENCY: ICD-10-CM

## 2024-07-23 DIAGNOSIS — R10.2 PELVIC PAIN: ICD-10-CM

## 2024-07-23 PROCEDURE — 97162 PT EVAL MOD COMPLEX 30 MIN: CPT | Mod: GP | Performed by: PHYSICAL THERAPIST

## 2024-07-23 PROCEDURE — 97535 SELF CARE MNGMENT TRAINING: CPT | Mod: GP | Performed by: PHYSICAL THERAPIST

## 2024-07-23 NOTE — PROGRESS NOTES
Physical Therapy  Physical Therapy Pelvic Floor Evaluation    Name: Evi Higgins  MRN: [id number]  : 1970  Today's Date: 24     Time Calculation  Start Time: 240  Stop Time: 340  Time Calculation (min): 60 min    Insurance: MMO, no auth  Visit # 1 of 40 for     Current Problem:  1. Urinary urgency  Referral to Physical Therapy    Follow Up In Physical Therapy      2. Pelvic pain  Referral to Physical Therapy    Follow Up In Physical Therapy          General  Reason for Referral: pelvic pain  Referred By: Juliann Sheldon  STEYOEL Fall Risk Score (The score of 4 or more indicates an increased risk of falling): 0  Precautions Comment: osteopenia  Vital Signs     Pain       Subjective  Chief Complaint: urinary urgency/incontinence (mad dash)  - throughout her entire life, worsening now with age  - limiting ability to travel  - restricting fluids/water + always thirsty + prone to kidney stones  - will pee and have to pee again very quickly  - told she has a really strong stream  - lots of travel to care for mom in [place]  Onset: youth  TORY: unknown    Current Condition: worse    PAIN  Intensity (0-10): 0  Location:   Description:     Relevant Information (PMH & Previous Tests/Imaging): see chart (recalls US of bladder in past)  Previous Interventions/Treatments: IMVexxy    Prior Level of Function (PLOF)  Exercise/Physical Activity: burn bootcamp in phases (4d/wk), walking with friends 1 hr/day, 2 days per week  Work/School:  &     Treatment Goals: reduce urgency    Cultural or Spiritual Practices: Bahai  History of Trauma: no  Safe at Home: yes    OB/GYN HISTORY:   Pregnancies (): 2   Births (Para): 2  Vaginal = 2  Episiotomy, Forceps, or Suction = no  Difficult Labor? With first baby  Prolapse? No    Painful [place] or vaginal penetration? Yes, since menopause (decreased arousal)  - using lubricant    BLADDER  Frequency of Urination  Daytime: every couple  of hours (full school day, go 2x/day, 4d/wk)  Nighttime: 0  Screening = Blood in urine? No Painful urination? no Recurrent infections/UTIs? No  Other Symptoms   Trouble initiating urine stream    Urine stream is intermittent or slow   X Trouble emptying bladder completely    Dribbling after urination    Straining or pushing to empty    Trouble feeling bladder urge/fullness   X Trouble holding urine when you get an urge   Urinary Incontinence/Leakage:  Frequency (day/week/month)? Every other day  Present with cough and/or sneeze? No  Present with physical activity and/or exertion? Happened twice, but usually no, restricting drinking before  How much urine do you leak? Dribble  Do you wear any barriers, such as pantishields or pads? No  Average Fluid Intake (glasses/day): at times, Subconsciously drinking water, Over-drinking    BOWEL  Frequency of Bowel Movements: every day or every other day  Bowel Incontinence/Leakage? No  Average Fiber Intake (per day): fruits, veggies, whole grains      Objective  Patient was educated on pelvic floor anatomy, function, and dysfunction.   Patient was educated on an orthopedic assessment & external pelvic floor assessment technique and verbalized consent.   The patient is aware they have full autonomy and can stop the assessment at any time.     Standing Assessment:   Posture: forward head, rounded shoulders, increased thoracic kyphosis  Gait: bilateral trendelenburg, decreased hip extension beyond midstance    OUTCOMES MEASURE:  Georgie Pelvic Dysfunction Screening Tool : positive    NIH-CPSI  -Pain: 0  - Urinary Symp: 7  - QOL: 9    Goals:   Active       PT Problem       PT Goal 1       Start:  07/23/24    Expected End:  10/21/24       Patient will return to prior level of function with minimal to no pain and without limitations for participation in ADLs, health, and exercise.   Patient demonstrate home exercise program adherence to supplement symptom reduction and functional  gains made in clinic  Patient will reports minimal to no pain for participation in ADLs and return to PLOF.   Patient will demonstrate coordination of pelvic floor, strength & relaxation wnl for return to ADLs and PLOF without restriction.   Pt will demonstrate improvement on the outcome measure score to demonstrate overall improved functional abilities and quality of life.              Education: home exercise program, plan of care, activity modifications, pain management, and injury pathology  Bladder Habits: Just in Case Pee, Hover over the toilet, relax & breathe, squatty potty use  Hydration Recommendation: 50% of your body wt (pounds) in fluid ounces  Bladder Irritants: caffeine, artificial sweeteners, carbonated beverages, alcohol  Fiber Intake Recommendation: 25-30g/day    Treatments: education  - bladder - PF reflexive function  - mind-body connection in relation to triggers and urge    Plan for Next Visit: continue objective assessment next visit  - urge suppression technique  - PF/hip/deep core strengthening  - behavioral training: reduce fluid intake, avoid JICing    Assessment: Patient presents with signs and symptoms consistent with urinary urge incontinence, urgency, frequency, resulting in limited participation in pain-free ADLs and inability to perform at their prior level of function. Pt would benefit from physical therapy to address the impairments found & listed previously in the objective section in order to return to safe and pain-free ADLs and prior level of function.    Plan:   Planned Interventions include: therapeutic exercise, self-care home management, manual therapy, therapeutic activities, gait training, neuromuscular coordination, aquatic therapy  Patient and/or family understands and agrees with goals and plan documented: yes  Frequency: 2x/month  Duration: 3-4 months     Elaina Naylor, PT

## 2024-08-06 ENCOUNTER — TELEPHONE (OUTPATIENT)
Dept: PRIMARY CARE | Facility: CLINIC | Age: 54
End: 2024-08-06

## 2024-08-06 ENCOUNTER — TREATMENT (OUTPATIENT)
Dept: PHYSICAL THERAPY | Facility: CLINIC | Age: 54
End: 2024-08-06
Payer: COMMERCIAL

## 2024-08-06 ENCOUNTER — TELEPHONE (OUTPATIENT)
Dept: PRIMARY CARE | Facility: CLINIC | Age: 54
End: 2024-08-06
Payer: COMMERCIAL

## 2024-08-06 DIAGNOSIS — R06.83 SNORING: Primary | ICD-10-CM

## 2024-08-06 DIAGNOSIS — R39.15 URINARY URGENCY: Primary | ICD-10-CM

## 2024-08-06 DIAGNOSIS — R10.2 PELVIC PAIN: ICD-10-CM

## 2024-08-06 PROCEDURE — 97110 THERAPEUTIC EXERCISES: CPT | Mod: GP | Performed by: PHYSICAL THERAPIST

## 2024-08-06 PROCEDURE — 97535 SELF CARE MNGMENT TRAINING: CPT | Mod: GP | Performed by: PHYSICAL THERAPIST

## 2024-08-06 NOTE — PROGRESS NOTES
Physical Therapy  Physical Therapy Pelvic Floor Evaluation    Name: Evi Higgins  MRN: [id number]  : 1970  Today's Date: 24     Time Calculation  Start Time: 1130  Stop Time: 1230  Time Calculation (min): 60 min    Insurance: MMO, no auth  Visit # 2 of 40 for     Current Problem:  1. Urinary urgency        2. Pelvic pain              General     Precautions     Vital Signs     Pain       Subjective  Chief Complaint: urinary urgency/incontinence (mad dash)  - throughout her entire life, worsening now with age  - limiting ability to travel  - restricting fluids/water + always thirsty + prone to kidney stones  - will pee and have to pee again very quickly  - told she has a really strong stream  - lots of travel to care for mom in [place]  Onset: youth  TORY: unknown    Today:   - noticed she was able to override her urgency and incontinence    PAIN  Intensity (0-10): 0  Location:   Description:     GYN: Painful [place] or vaginal penetration? Yes, since menopause (decreased arousal) - using lubricant  BLADDER: pelvic floor tightness  Daytime: every couple of hours (full school day, go 2x/day, 4d/wk)  Urinary Incontinence/Leakage: Yes - urge  Average Fluid Intake (glasses/day): at times, Subconsciously drinking water, Over-drinking    Objective  Posture: forward head, rounded shoulders, increased thoracic kyphosis  Gait: bilateral trendelenburg, decreased hip extension beyond midstance    Standing Assessment:   SL Stance: mild hip drop  DL Squat: wnl  Standing Lumbar Mobility: moderate limitation/tightness    Supine Movement Assessment   SLR: mild doming/pelvic rocking  Bridge: wnl  Hip PROM: wnl  MMT: sup hip: 4/5 bilaterally    Supine Mobility Assessment  - Hamstrings: moderate tightness  - Piriformis: mild tightness    Sidelying External Palpation:   Piriformis: L tightness/tenderness (strain)    Prone Palpation:   Lumbar Paraspinals: hypertonicity  PA lumbar mobility:  hypomobility    Treatments:  - discussed stress and pelvic floor/bladder  - discussed bladder health: no hovering, squatty potty, sit for 3 minutes, breathing  - urge suppression    Bladder Diary:   Voids: 6-9/day  Urge: 2-3 (mostly 3)  Seconds: 4-12 (mostly 4-8)  Water Bottle? Fluid ounces? How much intake?    Stretching Exercises: Pelvic Floor Part I    Plan for Next Visit: continue objective assessment next visit  - urge suppression technique  - PF/hip/deep core strengthening  - behavioral training: reduce fluid intake, avoid JICing    Assessment: Patient presents with signs and symptoms consistent with urinary urge incontinence, urgency, frequency, resulting in limited participation in pain-free ADLs and inability to perform at their prior level of function. Pt would benefit from physical therapy to address the impairments found & listed previously in the objective section in order to return to safe and pain-free ADLs and prior level of function.  - increased knowledge surrounding piriformis strain and exercise modifications  - increased knowledge surrounding urge and bladder/urination health    Plan:   Planned Interventions include: therapeutic exercise, self-care home management, manual therapy, therapeutic activities, gait training, neuromuscular coordination, aquatic therapy  Patient and/or family understands and agrees with goals and plan documented: yes  Frequency: 2x/month  Duration: 3-4 months     Elaina Naylor, PT

## 2024-08-06 NOTE — TELEPHONE ENCOUNTER
Ok for sleep study RF     GERD possible. Can try OTC omeprazole 20 mg. Krystina appt for f/up in 2-4wk ok dbl

## 2024-08-06 NOTE — TELEPHONE ENCOUNTER
Patient called in to see if an appt. Was necessary , or if she can just have a sleep study RF Entered . States she snores, and family has concerns with breathing while sleeping at night . Also states the when she eats too fast , or take in carbs she gets SOB , and concerned that it maybe GERD .

## 2024-08-06 NOTE — TELEPHONE ENCOUNTER
Patient LVM on Office line requesting a CB from Dr.Behms clinician , stated she has some questions. No further detail

## 2024-08-07 NOTE — TELEPHONE ENCOUNTER
Patient called back in informed her per Tawanda CROWELL for sleep study RF      GERD possible.      Can try OTC omeprazole 20 mg.      [facility] appt for f/up in 2-4wk ok dbl please call our office to schedule [phone]   She scheduled an appointment for 2-4 wks, will try diet changes if that doesn't work after a few days will try medication, AM

## 2024-08-08 ENCOUNTER — TELEPHONE (OUTPATIENT)
Dept: OBSTETRICS AND GYNECOLOGY | Facility: CLINIC | Age: 54
End: 2024-08-08
Payer: COMMERCIAL

## 2024-08-08 DIAGNOSIS — N95.2 ATROPHIC VAGINITIS: Primary | ICD-10-CM

## 2024-08-08 RX ORDER — ESTRADIOL 4 UG/1
INSERT VAGINAL
Qty: 24 EACH | Refills: 3 | Status: SHIPPED | OUTPATIENT
Start: 2024-08-08

## 2024-08-12 ENCOUNTER — TELEPHONE (OUTPATIENT)
Dept: OBSTETRICS AND GYNECOLOGY | Facility: CLINIC | Age: 54
End: 2024-08-12
Payer: COMMERCIAL

## 2024-08-12 ENCOUNTER — DOCUMENTATION (OUTPATIENT)
Dept: PHYSICAL THERAPY | Facility: CLINIC | Age: 54
End: 2024-08-12
Payer: COMMERCIAL

## 2024-08-12 NOTE — PROGRESS NOTES
Patient: Evi Higgins    [id number]  : 1970 -- AGE 53 y.o.    Provider: Brendan Mas MD PhD     Location [facility]   Service Date: 2024              [facility]  New Visit Note      HISTORY OF PRESENT ILLNESS     The patient's referring provider is: Behm, Brittany, DO    REASON FOR VISIT:   Chief Complaint   Patient presents with    Establish Care      HISTORY OF PRESENT ILLNESS   Ms. Evi Higgins is a 53 y.o. female with past medical history of scoliosis, irritable bowel syndrome who presents to a [facility] for a sleep medicine evaluation with concerns of snoring.    CURRENT HISTORY  On today's visit, the patient reports issues of snoring that fluctuates with her weight. She has rare times that she will wake herself due to her snoring. She is post menopausal. She has no issues with falling asleep. She might wake at 3AM for the BR and falls back asleep. Some nights she would stay awake till 5AM before falling asleep. She has now put her phone out of the bedroom. She needs about 7h to feel rested. If less than 5h she will be sleepy.    Sleep schedule:  In bed: 9:30-10AM  Activities in bed:   Bedtime Activities: turns out the lights  Subjective sleep latency:  < 15 min  Awakenings during night: at 3AM  Length of awakenings: generally < 30 minutes  Final awakening time: 6:30AM (no alarm clock  Out of bed: 6:30AM  Overall estimate of total sleep time: 6-7h    Weekends: wakes at 7AM  Preferred sleeping position: sidelying  Typically sleeps with her .       Associated sleep symptoms:  Breathing during sleep: witnessed apneas - but rare  Behaviors at night: No   Sleep paralysis: No   Hypnogogic or hypnopompic hallucinations: No   Cataplexy: No     Leg symptoms and timing:  - Sensations: Patient does not have unusual sensations in their extremities that cause an urge to move them       Sleep environment:  Pets in bed  ":  No  Bedroom temperature: WNL  Noise :   No\"   Issues with bed comfort :   No       Daytime Symptoms:  On awakening patient reports: waking refreshed    Daytime: During the day, she can doze unintentionally while inactive.  During more active tasks, she rarely is drowsy.  With regards to daytime napping, the patient reports sometimes taking naps. If she takes a nap - 15-30 minutes, typically she awakens refreshed.  She does not report that poor sleep results in mood-related irritability. She does not report that poor sleep results in issues with memory/concentration.    Driving History:  With driving, the patient denies sleepy driving, with 0 sleepiness-related motor vehicle accidents and 0 near misses.      ESS: 11  KAREN: 11  FOSQ:  32      REVIEW OF SYSTEMS     REVIEW OF SYSTEMS  Review of Systems   All other systems reviewed and are negative.      ALLERGIES AND MEDICATIONS     ALLERGIES  Allergies   Allergen Reactions    Ciprofloxacin Nausea Only, Other, Unknown, GI Upset and Nausea/vomiting     Other reaction(s): Other    Clarithromycin Nausea Only, Other, Unknown, GI Upset and Nausea/vomiting     Other reaction(s): GI Intolerance, Other    Erythromycin Nausea Only, Other, Unknown, GI Upset and Nausea/vomiting     Other reaction(s): GI Intolerance, Other    Ofloxacin Nausea Only, Other, Unknown and GI Upset     Other reaction(s): Other, Vomiting    Tetracycline Unknown    Tetracyclines Unknown       MEDICATIONS  Current Outpatient Medications   Medication Sig Dispense Refill    cholecalciferol (Vitamin D-3) 50 MCG (2000 UT) tablet Take 1 tablet (2,000 Units) by mouth in the morning.      estradioL (Imvexxy Maintenance Pack) 4 mcg insert Insert one vaginal insert twice weekly 24 each 3     No current facility-administered medications for this visit.         PAST HISTORY     PAST MEDICAL HISTORY  She  has a past medical history of Abdominal pain (02/16/2023), Abnormal finding on mammography (02/16/2023), Abnormal " uterine bleeding (AUB) (02/16/2023), Acute bronchitis (02/16/2023), Acute respiratory disease (02/16/2023), Acute URI (02/16/2023), Allergic ([place] as a child), Anxiety (Recently on/off), Atopic dermatitis (02/16/2023), Calculus of kidney, Constipation (02/16/2023), Cough (02/16/2023), Dermatitis (02/16/2023), Disorder of lipoprotein metabolism, unspecified, Dysuria (02/16/2023), Encounter for pregnancy test, result negative, Encounter for screening for malignant neoplasm of cervix (03/07/2022), Hematuria (02/16/2023), History of benign breast biopsy (02/16/2023), Left knee pain (02/16/2023), Liver lesion (02/16/2023), Mastodynia (05/01/2018), Menometrorrhagia (02/16/2023), Microscopic hematuria (02/16/2023), Missed menses (02/16/2023), Multiple births, unspecified, all liveborn ([facility]), Other conditions influencing health status, Other specified disorders of breast, Pain of finger (02/16/2023), Pelvic pain (02/16/2023), Personal history of other diseases of the female genital tract (01/21/2019), Personal history of other diseases of the respiratory system (11/15/2017), Personal history of other infectious and parasitic diseases, Personal history of other medical treatment, Pharyngitis (02/16/2023), Renal calculi (02/16/2023), Right knee pain (02/16/2023), Thumb pain (02/16/2023), Unspecified disorder of nose and nasal sinuses, Urgency of urination, Varicella (A child), and Yeast infection (02/16/2023).    PAST SURGICAL HISTORY:  Past Surgical History:   Procedure Laterality Date    BREAST BIOPSY  12/18/2017    Biopsy Breast Percutaneous Needle Core    COLONOSCOPY  04/19/2013    Complete Colonoscopy    COLONOSCOPY  04/04/2014    Colonoscopy (Fiberoptic)    MOUTH SURGERY  04/04/2014    Oral Surgery Tooth Extraction    OTHER SURGICAL HISTORY  03/10/2014    Dental Surgery       FAMILY HISTORY  Family History   Problem Relation Name Age of Onset    Diabetes Mother Hui Samuel     Hypertension Mother Hui Samuel      Osteoporosis Mother Hui Samuel     Heart failure Father Robert Samuel     Hyperlipidemia Father Robert Samuel     Myelodysplastic syndrome Father Robert Samuel     Celiac disease Sister Thomas Moses     Diabetes Sister Thomas Moses     Osteopenia Sister Thomas Moses     Osteoporosis Sister Thomas Moses     Breast cancer Maternal Grandmother      Other (Breast Disorder) Maternal Grandmother      Other (Cardiac Disorder) Paternal Grandmother      Stomach cancer Paternal Grandfather      Diabetes Sibling      Cancer Mother's Sister       She does have a family history of sleep disorder (e.g., sleep apnea, narcolepsy in any first degree relatives). Her father had KORTNEY and was on CPAP.     SOCIAL HISTORY  She  reports that she has never smoked. She has never used smokeless tobacco. She reports current alcohol use. She reports that she does not use drugs. She currently lives with her .  She teaches and  students.   Caffeine consumption: rare- coke  Alcohol consumption: social  Smoking: No  Marijuana: No      PHYSICAL EXAM     Physical Examination: /85   Pulse 54   Temp 37 °C (98.6 °F)   Resp 16   Wt 77.2 kg (170 lb 3.2 oz)   LMP 04/01/2022   SpO2 98%   BMI 28.32 kg/m²     PREVIOUS WEIGHTS:  Wt Readings from Last 3 Encounters:   08/14/24 77.2 kg (170 lb 3.2 oz)   04/04/24 75.8 kg (167 lb)   03/26/24 74.4 kg (164 lb)       General: The patient is a pleasant female, in no acute distress. HEENT: She has a  a modified Mallampati grade 3 airway with Numbers; 0-4 (with +): 1+ tonsils bilaterally. The soft palate was normal and the uvula was normal. The A/P diameter of the velopharynx was narrowed. The oropharynx was not shallow in the A/P diameter. Lateral wall narrowing was present. Tongue ridging was not present. Erythema of the posterior pharynx was not present. Mucosal hypertrophy in the posterior oropharynx was not present. Retrognathia was not and micrognathia was not present. Neck: The neck  "was not enlarged. No JVP, bruits or lymphadenopathy was appreciated. Chest: Clear to auscultation. No wheezes, rales, or rhonchi. Cardiovascular: Regular rate and rhythm. No murmurs, gallops, or clicks. Abdomen: Soft, nontender, nondistended. Positive bowel sounds. Extremities: No clubbing, cyanosis, or edema is noted. Neurologic exam: Alert, oriented x3 and was grossly non-focal.    RESULTS/DATA     No results found for: \"IRON\", \"TRANSFERRIN\", \"IRONSAT\", \"TIBC\", \"FERRITIN\"    Bicarbonate (mmol/L)   Date Value   04/01/2024 28   03/30/2023 29   04/04/2022 28   03/09/2021 28         DIAGNOSES     Problem List and Orders  Diagnoses and all orders for this visit:  Suspected sleep apnea  -     In-Center Sleep Study (Sleep Provider Only); Future  -     Follow Up In Adult Sleep Medicine; Future  Snoring  -     Referral to Adult Sleep Medicine  -     In-Center Sleep Study (Sleep Provider Only); Future  Overweight (BMI 25.0-29.9)        ASSESSMENT/PLAN     Ms. Higgins is a 53 y.o. female and with past medical history of scoliosis, irritable bowel syndrome. She presents to  the [facility] to address her snoring.    Snoring, possible KORTNEY. Ms. Higgins is evaluated for loud snoring in the context of an elevated body mass index.  This raises concerns for obstructive sleep apnea (KORTNEY). Given this, she should be scheduled for an overnight sleep study. The causes and potential consequences of KORTNEY were discussed in detail with the patient.      We discussed what a home sleep apnea test (HSAT) involves. If HSAT is significant for moderate- severe KORTNEY then we will pursue treatment. If testing is normal or equivocal, given the high pre-test probability, we may pursue in-lab sleep testing for further risk stratification. Additional sleep testing to demonstrate if CPAP or other treatments are beneficial may be necessary down the road.  If CPAP or APAP  is started for a diagnosis of KORTNEY, she  was informed " that she would need to return to the clinic for a follow-up in 1-3 months to assess initial response to PAP, address any PAP-related issue, and document PAP adherence. Other treatments including weight loss, positional therapy, surgical therapy, and oral appliances were also discussed.       Overweight.  The patient was counseled that her weight is the strongest modifiable risk factor and contributor for KORTNEY. She was counseled to consider weight loss options to include changes in dietary habits and activity.  Before initiating an exercise plan, she should carefully review the approach with her primary care provider.     Follow up: after testing         Brendan Mas MD PhD

## 2024-08-12 NOTE — PROGRESS NOTES
Patient arrived to office for a quick chat regarding her exercises.   Reports performing 1x since last visit with increased piriformis/glute pain/spasm after, not during.   We discussed only performing first 4-5 stretching exercises very gently for the next 2 days and monitoring response. If it is still painful, stop completely until next visit.   We discussed avoiding crossing legs and any stretching with a strap until next visit, possibly too compressive on hip.     Elaina Naylor, PT

## 2024-08-14 ENCOUNTER — APPOINTMENT (OUTPATIENT)
Dept: SLEEP MEDICINE | Facility: CLINIC | Age: 54
End: 2024-08-14
Payer: COMMERCIAL

## 2024-08-14 VITALS
SYSTOLIC BLOOD PRESSURE: 149 MMHG | TEMPERATURE: 98.6 F | OXYGEN SATURATION: 98 % | WEIGHT: 170.2 LBS | RESPIRATION RATE: 16 BRPM | BODY MASS INDEX: 28.32 KG/M2 | HEART RATE: 54 BPM | DIASTOLIC BLOOD PRESSURE: 85 MMHG

## 2024-08-14 DIAGNOSIS — E66.3 OVERWEIGHT (BMI 25.0-29.9): ICD-10-CM

## 2024-08-14 DIAGNOSIS — R29.818 SUSPECTED SLEEP APNEA: Primary | ICD-10-CM

## 2024-08-14 DIAGNOSIS — R06.83 SNORING: ICD-10-CM

## 2024-08-14 PROCEDURE — 1036F TOBACCO NON-USER: CPT | Performed by: INTERNAL MEDICINE

## 2024-08-14 PROCEDURE — 99204 OFFICE O/P NEW MOD 45 MIN: CPT | Performed by: INTERNAL MEDICINE

## 2024-08-14 ASSESSMENT — PAIN SCALES - GENERAL: PAINLEVEL: 0-NO PAIN

## 2024-08-14 NOTE — PATIENT INSTRUCTIONS
[facility]  [address]  [facility]  [address]  [facility] [address]    [address]  [facility]  [address]  [place] [address]  [facility]  [address]  [address]  [facility] [address]           NAME: Evi Higgins   DATE: 8/14/2024     Your Sleep Provider Today: Brendan Mas MD PhD  Your Primary Care Physician: Brittany Behm, DO   Your Referring Provider: Behm, Brittany, DO    Thank you for coming to the Sleep Medicine Clinic today! Your sleep medicine provider today was: Brendan Mas MD PhD Below is a summary of your treatment plan, other important information, and our contact numbers:  If you need to schedule an appointment, please call [phone])  If you need general assistance (e.g. forms completed, general questions), please call my , Rita, at [phone].  If you have a medical question about your sleep issues, please contact our nurses, Lindsay or Stephania at [phone].   You can also contact us through [email or web address].      DIAGNOSIS:   1. Suspected sleep apnea  In-Center Sleep Study (Sleep Provider Only)      2. Snoring  Referral to Adult Sleep Medicine    In-Center Sleep Study (Sleep Provider Only)              TREATMENT PLAN     - Get your sleep study scheduled  - If not already done, sign up for 'My Chart' and send prescription requests or messages through this      Scheduling a Sleep Study    Call the  Sleep Testing Center to speak with a sleep testing  to book your overnight sleep study procedure at one of our adult and pediatric-friendly sleep labs. Overnight sleep studies may be scheduled on a weekday or weekend.    We have child life services on a case by case basis at the [facility] location. We also perform daytime testing for shift workers on a case by case basis.    Locations for sleep studies are: [facility]  ([place]), [place], [place]ie, Arlene, [place], [facility], [place], [place].    Bring your usual medications and nightly routine items for your sleep study. In order to fall asleep faster in sleep lab, we advise patients to wake up earlier on the morning of the scheduled testing and avoid napping prior to testing. Sometimes, your provider may prescribe a sleep aid to be taken at lights out in the sleep lab. If you are taking a sleep aid, please have somebody pick you up after the sleep testing.    Results of your sleep study will be given to the ordering clinician. Please contact their office for results or follow up as directed by your clinician.    For additional information about the sleep medicine services, please call [phone]     Follow-up Appointment:   Followup with me after the study      IMPORTANT INFORMATION     Call 911 for medical emergencies.  Our offices are generally open from Monday-Friday, 9 am - 5 pm.  If you need to get in touch with me, you may either call me and my team(number is below) or you can use LOGIC DEVICES.  If a referral for a test, for CPAP, or for another specialist was made, and you have not heard about scheduling this within a week, please call scheduling at [phone] ([phone]).  If you are unable to make your appointment for clinic or an overnight study, kindly call the office at least 48 hours in advance to cancel and reschedule.  If you are on CPAP, please bring your device's card or the device to each clinic appointment.   There are no supporting services by either the sleep doctors or their staff on weekends and Holidays, or after 5 PM on weekdays.   If you have been asked to come to a sleep study, make sure you bring toiletries, a comfy pillow, and any nighttime medications that you may regularly take. Also be sure to eat dinner before you arrive. We generally do not provide meals.      PRESCRIPTIONS     We require 7 days advanced notice for prescription refills. If we do not  receive the request in this time, we cannot guarantee that your medication will be refilled in time.      IMPORTANT PHONE NUMBERS      scheduling for medical testin107 - [phone]   Sleep Medicine Clinic Fax: [phone]  Appointments (for Pediatric Sleep Clinic): [phone] - option 1  Appointments (for Adult Sleep Clinic): [phone] - option 2  Appointments (For Sleep Studies): [phone] - option 3  Behavioral Sleep Medicine: [phone]  Bariatric Surgery: [phone] ( Bariatric Surgery Website)   Sleep Surgery: [phone]  ENT (Otolaryngology): [phone]  Myofunctional Therapy (ENT): RAEGAN Ureña, Romie Alexander, Ritesh Hillman; [phone]   Lurdes Thompson; [phone]  Arlene Stewart; [phone]  [facility]; [phone]  Arnulfo Kovacs/[facility] [phone] (option 1)  Headache Clinic (Neurology): [phone]  Neurology: [phone]  Psychiatry: [phone]  Pulmonary Function Testing (PFT) Center: [phone]  Pulmonary Medicine: [phone]  [employer] (DME): [phone]  [employer] (DME): [phone]  [facility] ([facility]): [phone]      COMMON PROVIDERS WE REFER TO     For Weight Loss - Dr. Gaurav Burgess - Call [phone]  For Sleep Surgery - Dr. Ramakrishna Strickland - Call [phone]      OUR ADULT SLEEP MEDICINE TEAM   Please do not hesitate to call the office or sleep nurse with any questions between appointments:    Adult Sleep Nurses (Stephania Burciaga, RN and Lindsay Nails RN):  For clinical questions and refilling prescriptions: [phone]  Email sleep diaries and other documents at: [email or web address]    Adult Sleep Medicine Secretaries:  Kirsten Gilman (For Deangelo/Irving/Niya/Jan/Patricio/Augusto):   P: [phone]  F: [phone]  Rita Webster (For Tg/Angie): P: [phone]  Fax: [phone]  Lien Cornejo (For  Nikunj/Brianda): P: [phone]  F: [phone]  Josefina Garcia (For Reyna): P: [phone]  F: [phone]  Elsa Diomedes (For Dennise/Campos/Zakhary): P: [phone]  F: [phone]  Ailyn Carson (For Blue/Karthik): P: [phone]  F: [phone]     Adult Sleep Medicine Advanced Practice Providers:  Eran Lamar ([place], [place])  Theresa Gasca ([facility])  Domitila Adams CNP ([place], [place], [place])  Sandra Núñez CNP ([place], [place], [place])  Zahra Orellana ([place], [place], [place])  Liset [place] CNP ([place], [place])        OUR SLEEP TESTING LOCATIONS     Our team will contact you to schedule your sleep study, however, you can contact us as follow:  Main Phone Line (scheduling only): [phone]), option 3  Adult and Pediatric Locations  [facility] (6 years and older): [facility] - 4th floor ([address] [place] [address]) After hours line: [phone]  [facility] (Main campus: All ages): [facility], 6th floor. After hours line: [phone]   [place] (5 years and older; younger considered on case-by-case basis): [address]; [address], [address]. Scheduling  After hours line: [phone]   [place] (6 years and older): [address]; [address]; [address]   [place] (6 years and older): [address] [facility] [address] [facility], [address]  After hours line: [phone]   [place] (13 years and older) in [place]: [address], 2nd floor  After hours line: [phone]  [facility] (13 year and older): [address]  After hours line: [phone]     Adult Only Locations:   [place] (18 years and older): [address] [place] [address], 2nd floor   [place] (18 years and older): [address] [facility]; 4th floor  After hours line: [phone]  [facility] (18 years and older) at [place]: [address] [place] [address]  After hours line: [phone]          CONTACTING YOUR  "SLEEP MEDICINE PROVIDER     Send a message directly to your provider through \"My Chart\", which is the email service through your  Records Account: [email or web address]   Call [phone] and leave a message. One of the administrative assistants will forward the message to your sleep medicine provider through \"My Chart\" and/or email.     Your sleep medicine provider for this visit was: Brendan Mas MD PhD        "

## 2024-08-20 ENCOUNTER — TREATMENT (OUTPATIENT)
Dept: PHYSICAL THERAPY | Facility: CLINIC | Age: 54
End: 2024-08-20
Payer: COMMERCIAL

## 2024-08-20 DIAGNOSIS — R10.2 PELVIC PAIN: ICD-10-CM

## 2024-08-20 DIAGNOSIS — R39.15 URINARY URGENCY: Primary | ICD-10-CM

## 2024-08-20 PROCEDURE — 97535 SELF CARE MNGMENT TRAINING: CPT | Mod: GP | Performed by: PHYSICAL THERAPIST

## 2024-08-20 PROCEDURE — 97112 NEUROMUSCULAR REEDUCATION: CPT | Mod: GP | Performed by: PHYSICAL THERAPIST

## 2024-08-20 NOTE — LETTER
To Whom It May Concern,     Evi Higgins is currently undergoing physical therapy for a recent hip injury and is unable to participate in [employer] classes at this time. Please allow this letter to serve as written notice for a hold on her membership for the next 1-2 months while recovering.     Sincerely,     Elaina Naylor, PT, DPT, SCS  Senior Physical Therapist  Women’s Health Physical Therapy  Pelvic Floor & Breast Rehab    [facility] ([facility])  [address] [place] [address]  #[phone], option 4

## 2024-08-20 NOTE — PROGRESS NOTES
Physical Therapy  Physical Therapy Pelvic Floor    Name: Evi Higgins  MRN: [id number]  : 1970  Today's Date: 24     Time Calculation  Start Time: 730  Stop Time: 830  Time Calculation (min): 60 min    Insurance: MMO, no auth  Visit # 3 of 40 for     Current Problem:  1. Urinary urgency        2. Pelvic pain            General     Precautions     Vital Signs     Pain       Subjective  Chief Complaint: urinary urgency/incontinence (mad dash)  - throughout her entire life, worsening now with age  - limiting ability to travel  - restricting fluids/water + always thirsty + prone to kidney stones  - will pee and have to pee again very quickly  - told she has a really strong stream  - lots of travel to care for mom in [place]  Onset: youth  TORY: unknown    Today:   - walking everyday, instead of bootcamp due to hip/glute pain  - hip glute pain present intermittently (felt this morning and in car, but better with movement)  - gentle stretching first 5 exercises, no increase in pain  - incontinence x2 - strong urge - was able to over-ride urge 1 of those times  - 2-2.5x 25fl oz water bottles per day    PAIN  Intensity (0-10): 0  Location:   Description:     GYN: Painful [place] or vaginal penetration? Yes, since menopause (decreased arousal) - using lubricant  BLADDER: pelvic floor tightness  Daytime: every couple of hours (full school day, go 2x/day, 4d/wk)  Urinary Incontinence/Leakage: Yes - urge  Average Fluid Intake (glasses/day): at times, Subconsciously drinking water, Over-drinking    Objective  Posture: forward head, rounded shoulders, increased thoracic kyphosis  Gait: bilateral trendelenburg, decreased hip extension beyond midstance    Standing Assessment:   SL Stance: mild hip drop  DL Squat: wnl  Standing Lumbar Mobility: moderate limitation/tightness    Supine Movement Assessment   SLR: mild doming/pelvic rocking  Bridge: wnl  Hip PROM: wnl  MMT: sup hip: 4/5 bilaterally    Supine  Mobility Assessment  - Hamstrings: moderate tightness  - Piriformis: mild tightness    Sidelying External Palpation:   Piriformis: L tightness/tenderness (strain)    Prone Palpation:   Lumbar Paraspinals: hypertonicity  PA lumbar mobility: hypomobility    Bladder Diary:   Voids: 6-9/day  Urge: 2-3 (mostly 3)  Seconds: 4-12 (mostly 4-8)    Treatments:  - discussed stress and pelvic floor/bladder  - discussed bladder health: no hovering, squatty potty, sit for 3 minutes, breathing - continue  - urge suppression (continued emphasis on: calm & kegel)  - discussed continue 50-65 fl oz /day  - bladder schedule: peeing every 3-4 hours    STRETCHING Exercises: Pelvic Floor Part I - first 4-5 exercises, gently  - Code: [id number]    STRENGTH  Access Code: [email or web address]  - Supine Pelvic Tilt  - 3-5 x weekly - 10 reps  - Hooklying Transversus Abdominis Palpation  - 3-5 x weekly - 5-10 reps - 2 breaths hold  - Supine March with Alternating Leg Lifts  - 3-5 x weekly - 3 sets - 10 reps  - Supine Hip Adduction Isometric with Ball  - 3-5 x weekly - 3 sets - 10 reps  - Hooklying Clamshell with Resistance  - 3-5 x weekly - 3 sets - 10 reps  - Bridge  - 3-5 x weekly - 3 sets - 10 reps    Plan for Next Visit:  - urge suppression technique  - PF/hip/deep core strengthening  - behavioral training: reduce fluid intake, avoid JICing    Assessment: Patient presents with signs and symptoms consistent with urinary urge incontinence, urgency, frequency, resulting in limited participation in pain-free ADLs and inability to perform at their prior level of function. Pt would benefit from physical therapy to address the impairments found & listed previously in the objective section in order to return to safe and pain-free ADLs and prior level of function.  - continued emphasis on bladder training and possible use of bladder schedule with busy days  - tolerated progression of exercises well without increased pain or limitations    Plan:   Planned  Interventions include: therapeutic exercise, self-care home management, manual therapy, therapeutic activities, gait training, neuromuscular coordination, aquatic therapy  Patient and/or family understands and agrees with goals and plan documented: yes  Frequency: 2x/month  Duration: 3-4 months     Elaina Naylor, PT

## 2024-09-03 ENCOUNTER — APPOINTMENT (OUTPATIENT)
Dept: PHYSICAL THERAPY | Facility: CLINIC | Age: 54
End: 2024-09-03
Payer: COMMERCIAL

## 2024-09-04 ENCOUNTER — APPOINTMENT (OUTPATIENT)
Dept: PRIMARY CARE | Facility: CLINIC | Age: 54
End: 2024-09-04
Payer: COMMERCIAL

## 2024-09-04 VITALS
DIASTOLIC BLOOD PRESSURE: 86 MMHG | HEIGHT: 65 IN | SYSTOLIC BLOOD PRESSURE: 120 MMHG | BODY MASS INDEX: 28.12 KG/M2 | HEART RATE: 77 BPM | RESPIRATION RATE: 16 BRPM | TEMPERATURE: 98.2 F | WEIGHT: 168.8 LBS | OXYGEN SATURATION: 98 %

## 2024-09-04 DIAGNOSIS — R79.89 LOW VITAMIN D LEVEL: ICD-10-CM

## 2024-09-04 DIAGNOSIS — M85.80 OSTEOPENIA, UNSPECIFIED LOCATION: ICD-10-CM

## 2024-09-04 DIAGNOSIS — Z00.00 ANNUAL PHYSICAL EXAM: ICD-10-CM

## 2024-09-04 DIAGNOSIS — R13.10 DYSPHAGIA, UNSPECIFIED TYPE: Primary | ICD-10-CM

## 2024-09-04 DIAGNOSIS — S76.012D MUSCLE STRAIN OF LEFT GLUTEAL REGION, SUBSEQUENT ENCOUNTER: ICD-10-CM

## 2024-09-04 PROCEDURE — 1036F TOBACCO NON-USER: CPT | Performed by: FAMILY MEDICINE

## 2024-09-04 PROCEDURE — 99214 OFFICE O/P EST MOD 30 MIN: CPT | Performed by: FAMILY MEDICINE

## 2024-09-04 PROCEDURE — 3008F BODY MASS INDEX DOCD: CPT | Performed by: FAMILY MEDICINE

## 2024-09-04 RX ORDER — CHOLECALCIFEROL (VITAMIN D3) 50 MCG
4000 TABLET ORAL DAILY
Qty: 60 TABLET | Refills: 11 | Status: SHIPPED | OUTPATIENT
Start: 2024-09-04 | End: 2025-09-04

## 2024-09-04 NOTE — PROGRESS NOTES
"Subjective   Evi Higgins is a 53 y.o. female who presents for GERD, Snoring, and Medical Advice/Question (Discuss K3 with vit D supp).  HPI      Working on wt loss.     GERD called last mo, now better. Bread was getting caught. Not on any Rx. Now resolved. No wt loss.     L buttock area pulled muscle. Comes and goes. Pelvic floor PT os working w her.     Saw sleep med. Sleeping better w less stress for last 2 wk.     Sister and mom w OP.   Current Outpatient Medications on File Prior to Visit   Medication Sig Dispense Refill    estradioL (Imvexxy Maintenance Pack) 4 mcg insert Insert one vaginal insert twice weekly 24 each 3    [DISCONTINUED] cholecalciferol (Vitamin D-3) 50 MCG (2000 UT) tablet Take 1 tablet (2,000 Units) by mouth once daily.       No current facility-administered medications on file prior to visit.                  Objective   /86 (BP Location: Right arm)   Pulse 77   Temp 36.8 °C (98.2 °F)   Resp 16   Ht 1.651 m (5' 5\")   Wt 76.6 kg (168 lb 12.8 oz)   LMP 04/01/2022   SpO2 98%   BMI 28.09 kg/m²    Physical Exam  General: NAD  HEENT:NCAT, PERRLA, nml OP  Neck: no cervical TOSHIA  Heart: RRR no murmur, no edema   Lungs: CTA b/l, no wheeze or rhonchi   GI: abd soft, nontender, nondistended.   MSK: no c/c/e. nml gait   Skin: warm and dry  Psych: cooperative, appropriate affect  Neuro: speech clear. A&Ox3  Assessment/Plan   Problem List Items Addressed This Visit    None  Visit Diagnoses       Dysphagia, unspecified type    -  Primary  -occurs only w bread  -discussed smaller bites, water after eating, slower eating  -if worsening/no improvement will get barium swallow +/- EGD       Muscle strain of left gluteal region, subsequent encounter      Improving w PT       Osteopenia, unspecified location      Cont w vit D 4000  No Ca d/t kid stones   Wt bearing ex   Rpt 2 yrs       Low vitamin D level      Improved w inc vit D from 2000 to 4000  Does not have to take vit K w vit D       " Relevant Orders    Vitamin D 25 hydroxy    Annual physical exam      CPE 4/2025 labs prior       Relevant Orders    CBC and Auto Differential    Comprehensive Metabolic Panel    Hemoglobin A1C    Lipid Panel    TSH with reflex to Free T4 if abnormal    Insomnia: improved w less stress. If cont to improve can cancel sleep study

## 2024-09-12 ENCOUNTER — TREATMENT (OUTPATIENT)
Dept: PHYSICAL THERAPY | Facility: CLINIC | Age: 54
End: 2024-09-12
Payer: COMMERCIAL

## 2024-09-12 DIAGNOSIS — R39.15 URINARY URGENCY: ICD-10-CM

## 2024-09-12 DIAGNOSIS — R10.2 PELVIC PAIN: ICD-10-CM

## 2024-09-12 PROCEDURE — 97535 SELF CARE MNGMENT TRAINING: CPT | Mod: GP | Performed by: PHYSICAL THERAPIST

## 2024-09-12 NOTE — PROGRESS NOTES
Physical Therapy  Physical Therapy Pelvic Floor    Name: Evi Higgins  MRN: [id number]  : 1970  Today's Date: 24     Time Calculation  Start Time: 030  Stop Time: 340  Time Calculation (min): 40 min    Insurance: MMO, no shannan  Visit # 4 of 40 for     Current Problem:  1. Urinary urgency  Follow Up In Physical Therapy      2. Pelvic pain  Follow Up In Physical Therapy          General     Precautions     Vital Signs     Pain       Subjective  Chief Complaint: urinary urgency/incontinence (mad dash)  - throughout her entire life, worsening now with age  - limiting ability to travel  - restricting fluids/water + always thirsty + prone to kidney stones  - will pee and have to pee again very quickly  - told she has a really strong stream  - lots of travel to care for mom in [place]  Onset: youth  TORY: unknown    Today:   - has been sick recently, no exercise  - L hip pain present with sitting on L side and in car for prolonged periods, moment of forgetting    PAIN  Intensity (0-10): 0  Location:   Description:     GYN: Painful [facility] or vaginal penetration? Yes, since menopause (decreased arousal) - using lubricant  BLADDER: pelvic floor tightness  Daytime: every couple of hours (full school day, go 2x/day, 4d/wk)  Urinary Incontinence/Leakage: Yes - urge  Average Fluid Intake (glasses/day): at times, Subconsciously drinking water, Over-drinking    Objective  Posture: forward head, rounded shoulders, increased thoracic kyphosis  Gait: bilateral trendelenburg, decreased hip extension beyond midstance    Standing Assessment:   SL Stance: mild hip drop  DL Squat: wnl  Standing Lumbar Mobility: moderate limitation/tightness    Supine Movement Assessment   SLR: mild doming/pelvic rocking  Bridge: wnl  Hip PROM: wnl  MMT: sup hip: 4/5 bilaterally    Supine Mobility Assessment  - Hamstrings: moderate tightness  - Piriformis: mild tightness    Sidelying External Palpation:   Piriformis: L  tightness/tenderness (strain)    Prone Palpation:   Lumbar Paraspinals: hypertonicity  PA lumbar mobility: hypomobility    Bladder Diary:   Voids: 6-9/day  Urge: 2-3 (mostly 3)  Seconds: 4-12 (mostly 4-8)    Treatments:  - discussed stress, nervous system, and pelvic floor/bladder  - discussed bladder health: no hovering, squatty potty, sit for 3 minutes, breathing - continue  - urge suppression (continued emphasis on: calm & kegel)  - discussed continue 50-65 fl oz /day  - bladder schedule: peeing every 3-4 hours  - reviewed healthy bladder habits    STRETCHING Exercises: Pelvic Floor Part I - first 4-5 exercises, gently  - Code: [id number]    STRENGTH - emphasis on continued exercises  Access Code: [email or web address]  - Supine Pelvic Tilt  - 3-5 x weekly - 10 reps  - Hooklying Transversus Abdominis Palpation  - 3-5 x weekly - 5-10 reps - 2 breaths hold  - Supine March with Alternating Leg Lifts  - 3-5 x weekly - 3 sets - 10 reps  - Supine Hip Adduction Isometric with Ball  - 3-5 x weekly - 3 sets - 10 reps  - Hooklying Clamshell with Resistance  - 3-5 x weekly - 3 sets - 10 reps  - Bridge  - 3-5 x weekly - 3 sets - 10 reps    Plan for Next Visit:  - urge suppression technique  - PF/hip/deep core strengthening  - behavioral training: reduce fluid intake, avoid JICing    Assessment: Patient presents with signs and symptoms consistent with urinary urge incontinence, urgency, frequency, resulting in limited participation in pain-free ADLs and inability to perform at their prior level of function. Pt would benefit from physical therapy to address the impairments found & listed previously in the objective section in order to return to safe and pain-free ADLs and prior level of function.  - continued emphasis on bladder training and possible use of bladder schedule with busy days  - emphasis on getting back to exercises without pain when feeling better    Plan:   Planned Interventions include: therapeutic exercise, self-care home  management, manual therapy, therapeutic activities, gait training, neuromuscular coordination, aquatic therapy  Patient and/or family understands and agrees with goals and plan documented: yes  Frequency: 2x/month  Duration: 3-4 months     Elaina Naylor, PT

## 2024-09-16 ENCOUNTER — TELEPHONE (OUTPATIENT)
Dept: PRIMARY CARE | Facility: CLINIC | Age: 54
End: 2024-09-16
Payer: COMMERCIAL

## 2024-09-16 DIAGNOSIS — B96.89 BACTERIAL SINUSITIS: Primary | ICD-10-CM

## 2024-09-16 DIAGNOSIS — J32.9 BACTERIAL SINUSITIS: Primary | ICD-10-CM

## 2024-09-16 RX ORDER — AMOXICILLIN AND CLAVULANATE POTASSIUM 875; 125 MG/1; MG/1
875 TABLET, FILM COATED ORAL 2 TIMES DAILY
Qty: 14 TABLET | Refills: 0 | Status: SHIPPED | OUTPATIENT
Start: 2024-09-16 | End: 2024-09-23

## 2024-09-16 NOTE — TELEPHONE ENCOUNTER
Onset 8 days ago:  Sx sputum, coughing attacks, ears on/off clogged, nasal discharge of various colors/textures, exhausted.  Covid  negative on 9/10 & 9/13.  Taking allergy relief loratadine 10mg for 4 days.    Asking for Abx for SI?   Pharm [facility]

## 2024-09-17 ENCOUNTER — APPOINTMENT (OUTPATIENT)
Dept: PHYSICAL THERAPY | Facility: CLINIC | Age: 54
End: 2024-09-17
Payer: COMMERCIAL

## 2024-10-01 ENCOUNTER — APPOINTMENT (OUTPATIENT)
Dept: SLEEP MEDICINE | Facility: CLINIC | Age: 54
End: 2024-10-01
Payer: COMMERCIAL

## 2024-10-07 ENCOUNTER — TELEPHONE (OUTPATIENT)
Dept: PRIMARY CARE | Facility: CLINIC | Age: 54
End: 2024-10-07
Payer: COMMERCIAL

## 2024-10-08 ENCOUNTER — OFFICE VISIT (OUTPATIENT)
Dept: PRIMARY CARE | Facility: CLINIC | Age: 54
End: 2024-10-08
Payer: COMMERCIAL

## 2024-10-08 ENCOUNTER — HOSPITAL ENCOUNTER (OUTPATIENT)
Dept: RADIOLOGY | Facility: HOSPITAL | Age: 54
Discharge: HOME | End: 2024-10-08
Payer: COMMERCIAL

## 2024-10-08 ENCOUNTER — TELEPHONE (OUTPATIENT)
Dept: PRIMARY CARE | Facility: CLINIC | Age: 54
End: 2024-10-08

## 2024-10-08 ENCOUNTER — LAB (OUTPATIENT)
Dept: LAB | Facility: LAB | Age: 54
End: 2024-10-08
Payer: COMMERCIAL

## 2024-10-08 ENCOUNTER — HOSPITAL ENCOUNTER (OUTPATIENT)
Dept: RADIOLOGY | Facility: CLINIC | Age: 54
Discharge: HOME | End: 2024-10-08
Payer: COMMERCIAL

## 2024-10-08 VITALS
OXYGEN SATURATION: 96 % | HEART RATE: 65 BPM | WEIGHT: 165.8 LBS | HEIGHT: 65 IN | RESPIRATION RATE: 16 BRPM | BODY MASS INDEX: 27.63 KG/M2 | DIASTOLIC BLOOD PRESSURE: 88 MMHG | TEMPERATURE: 97.6 F | SYSTOLIC BLOOD PRESSURE: 142 MMHG

## 2024-10-08 DIAGNOSIS — R10.11 RUQ PAIN: ICD-10-CM

## 2024-10-08 DIAGNOSIS — K57.31 DIVERTICULAR HEMORRHAGE: ICD-10-CM

## 2024-10-08 DIAGNOSIS — D21.9 FIBROID: ICD-10-CM

## 2024-10-08 DIAGNOSIS — K62.5 RECTAL BLEEDING: ICD-10-CM

## 2024-10-08 DIAGNOSIS — K52.9 COLITIS: Primary | ICD-10-CM

## 2024-10-08 DIAGNOSIS — K62.5 RECTAL BLEEDING: Primary | ICD-10-CM

## 2024-10-08 LAB
ALBUMIN SERPL BCP-MCNC: 4.4 G/DL (ref 3.4–5)
ALP SERPL-CCNC: 86 U/L (ref 33–110)
ALT SERPL W P-5'-P-CCNC: 18 U/L (ref 7–45)
ANION GAP SERPL CALC-SCNC: 11 MMOL/L (ref 10–20)
AST SERPL W P-5'-P-CCNC: 15 U/L (ref 9–39)
BASOPHILS # BLD AUTO: 0.05 X10*3/UL (ref 0–0.1)
BASOPHILS NFR BLD AUTO: 1.1 %
BILIRUB SERPL-MCNC: 0.8 MG/DL (ref 0–1.2)
BUN SERPL-MCNC: 18 MG/DL (ref 6–23)
CALCIUM SERPL-MCNC: 9.4 MG/DL (ref 8.6–10.6)
CHLORIDE SERPL-SCNC: 102 MMOL/L (ref 98–107)
CO2 SERPL-SCNC: 32 MMOL/L (ref 21–32)
CREAT SERPL-MCNC: 0.72 MG/DL (ref 0.5–1.05)
EGFRCR SERPLBLD CKD-EPI 2021: >90 ML/MIN/1.73M*2
EOSINOPHIL # BLD AUTO: 0.15 X10*3/UL (ref 0–0.7)
EOSINOPHIL NFR BLD AUTO: 3.3 %
ERYTHROCYTE [DISTWIDTH] IN BLOOD BY AUTOMATED COUNT: 12.6 % (ref 11.5–14.5)
GLUCOSE SERPL-MCNC: 99 MG/DL (ref 74–99)
HCT VFR BLD AUTO: 42.6 % (ref 36–46)
HGB BLD-MCNC: 15.5 G/DL (ref 12–16)
IMM GRANULOCYTES # BLD AUTO: 0.07 X10*3/UL (ref 0–0.7)
IMM GRANULOCYTES NFR BLD AUTO: 1.5 % (ref 0–0.9)
LYMPHOCYTES # BLD AUTO: 1.45 X10*3/UL (ref 1.2–4.8)
LYMPHOCYTES NFR BLD AUTO: 31.6 %
MCH RBC QN AUTO: 32.6 PG (ref 26–34)
MCHC RBC AUTO-ENTMCNC: 36.4 G/DL (ref 32–36)
MCV RBC AUTO: 90 FL (ref 80–100)
MONOCYTES # BLD AUTO: 0.4 X10*3/UL (ref 0.1–1)
MONOCYTES NFR BLD AUTO: 8.7 %
NEUTROPHILS # BLD AUTO: 2.47 X10*3/UL (ref 1.2–7.7)
NEUTROPHILS NFR BLD AUTO: 53.8 %
NRBC BLD-RTO: 0 /100 WBCS (ref 0–0)
PLATELET # BLD AUTO: 325 X10*3/UL (ref 150–450)
POTASSIUM SERPL-SCNC: 4 MMOL/L (ref 3.5–5.3)
PROT SERPL-MCNC: 7.6 G/DL (ref 6.4–8.2)
RBC # BLD AUTO: 4.76 X10*6/UL (ref 4–5.2)
SODIUM SERPL-SCNC: 141 MMOL/L (ref 136–145)
WBC # BLD AUTO: 4.6 X10*3/UL (ref 4.4–11.3)

## 2024-10-08 PROCEDURE — 80053 COMPREHEN METABOLIC PANEL: CPT

## 2024-10-08 PROCEDURE — 74177 CT ABD & PELVIS W/CONTRAST: CPT | Performed by: RADIOLOGY

## 2024-10-08 PROCEDURE — 74177 CT ABD & PELVIS W/CONTRAST: CPT

## 2024-10-08 PROCEDURE — 99215 OFFICE O/P EST HI 40 MIN: CPT | Performed by: FAMILY MEDICINE

## 2024-10-08 PROCEDURE — 76857 US EXAM PELVIC LIMITED: CPT | Performed by: RADIOLOGY

## 2024-10-08 PROCEDURE — 1036F TOBACCO NON-USER: CPT | Performed by: FAMILY MEDICINE

## 2024-10-08 PROCEDURE — 85025 COMPLETE CBC W/AUTO DIFF WBC: CPT

## 2024-10-08 PROCEDURE — 36415 COLL VENOUS BLD VENIPUNCTURE: CPT

## 2024-10-08 PROCEDURE — 76856 US EXAM PELVIC COMPLETE: CPT

## 2024-10-08 PROCEDURE — 3008F BODY MASS INDEX DOCD: CPT | Performed by: FAMILY MEDICINE

## 2024-10-08 PROCEDURE — 2550000001 HC RX 255 CONTRASTS: Performed by: FAMILY MEDICINE

## 2024-10-08 PROCEDURE — 76830 TRANSVAGINAL US NON-OB: CPT | Performed by: RADIOLOGY

## 2024-10-08 RX ORDER — METRONIDAZOLE 500 MG/1
500 TABLET ORAL 3 TIMES DAILY
Qty: 21 TABLET | Refills: 0 | Status: SHIPPED | OUTPATIENT
Start: 2024-10-08 | End: 2024-10-15

## 2024-10-08 RX ORDER — AMOXICILLIN 500 MG/1
500 CAPSULE ORAL 3 TIMES DAILY
Qty: 21 CAPSULE | Refills: 0 | Status: SHIPPED | OUTPATIENT
Start: 2024-10-08 | End: 2024-10-15

## 2024-10-08 NOTE — TELEPHONE ENCOUNTER
Called pt   CT w sigmoid colitis   Start amoxil (intol of augmentin) 500 mg TID/flagyl 500 TID  F/up GI as bhavna next week     Small fibroid-check TVUS    Labs nml

## 2024-10-08 NOTE — PROGRESS NOTES
"Subjective   Evi Higgins is a 53 y.o. female who presents for Rectal Bleeding.  HPI    Hx of GI issues w certain foods \"spastic colon\"  Had severe abd cramping after eating a hot dog and peanuts 3 d ago. Had loose BM shortly after the pain  Later that day had bright red blood BM  Had dark BM next d.   Tried the next day but better yest.   Felt better today.   No straining   1.5 wk ago was constipated for 2-3 d.   C-scope 2016 hepatic diverticulosis, int/ex hemorrhoid   Current Outpatient Medications on File Prior to Visit   Medication Sig Dispense Refill    cholecalciferol (Vitamin D-3) 50 MCG (2000 UT) tablet Take 2 tablets (4,000 Units) by mouth once daily. 60 tablet 11    estradioL (Imvexxy Maintenance Pack) 4 mcg insert Insert one vaginal insert twice weekly 24 each 3     No current facility-administered medications on file prior to visit.                  Objective   /88 (BP Location: Right arm)   Pulse 65   Temp 36.4 °C (97.6 °F)   Resp 16   Ht 1.651 m (5' 5\")   Wt 75.2 kg (165 lb 12.8 oz)   LMP 04/01/2022   SpO2 96%   BMI 27.59 kg/m²    Physical Exam  General: NAD  HEENT:NCAT, PERRLA, nml OP  Neck: no cervical TOSHIA  Heart: RRR no murmur, no edema   Lungs: CTA b/l, no wheeze or rhonchi   GI: abd soft, RUQ pain nondistended.   Rectal: soft nontender ext hemorrhoid, nml rectal tone   MSK: no c/c/e. nml gait   Skin: warm and dry  Psych: cooperative, appropriate affect  Neuro: speech clear. A&Ox3  Assessment/Plan   Problem List Items Addressed This Visit    None  Visit Diagnoses       Rectal bleeding    -  Primary  Acute rectal bleeding/abd pain w known hepatic diverticulosis suspicious for diverticular bleed/diverticulitis  Stat CT abd   Check labs   RF GI as will likely need rpt c-scope     Relevant Orders    CBC and Auto Differential    Comprehensive Metabolic Panel    CT abdomen pelvis w IV contrast    Referral to Gastroenterology    Diverticular hemorrhage        Relevant Orders    CT abdomen " pelvis w IV contrast    Referral to Gastroenterology    RUQ pain        Relevant Orders    CT abdomen pelvis w IV contrast    Referral to Gastroenterology

## 2024-10-15 ENCOUNTER — TREATMENT (OUTPATIENT)
Dept: PHYSICAL THERAPY | Facility: CLINIC | Age: 54
End: 2024-10-15
Payer: COMMERCIAL

## 2024-10-15 DIAGNOSIS — R10.2 PELVIC PAIN: ICD-10-CM

## 2024-10-15 DIAGNOSIS — R39.15 URINARY URGENCY: ICD-10-CM

## 2024-10-15 PROCEDURE — 97535 SELF CARE MNGMENT TRAINING: CPT | Mod: GP | Performed by: PHYSICAL THERAPIST

## 2024-10-15 NOTE — PROGRESS NOTES
Physical Therapy  Physical Therapy Pelvic Floor    Name: Evi Higgins  MRN: [id number]  : 1970  Today's Date: 10/15/24     Time Calculation  Start Time: 1530  Stop Time: 1620  Time Calculation (min): 50 min    Insurance: MMO, no [facility]  Visit # 5 of 40 for     Current Problem:  1. Urinary urgency  Follow Up In Physical Therapy      2. Pelvic pain  Follow Up In Physical Therapy          General     Precautions     Vital Signs     Pain       Subjective  Chief Complaint: urinary urgency/incontinence (mad dash)  - throughout her entire life, worsening now with age  - limiting ability to travel  - restricting fluids/water + always thirsty + prone to kidney stones  - will pee and have to pee again very quickly  - told she has a really strong stream  - lots of travel to care for mom in [place]  Onset: youth  TORY: unknown    Today:   - spastic colon episode, flare of diverticulosis  - no exercise due to 3 recent illnesses  - L hip pain very mild, and only at times, such as sitting on hip for prolonged periods  - hep compliance: no with being sick  - noticed postural changes and a bump on her back/thoracic area, no pain, but can't lie flat    PAIN  Intensity (0-10): 0  Location:   Description:     GYN: Painful [place] or vaginal penetration? Yes, since menopause (decreased arousal) - using lubricant  BLADDER: pelvic floor tightness  Daytime: every couple of hours (full school day, go 2x/day, 4d/wk)  Urinary Incontinence/Leakage: Yes - urge  Average Fluid Intake (glasses/day): at times, Subconsciously drinking water, Over-drinking    Objective  Posture: forward head, rounded shoulders, increased thoracic kyphosis  Gait: bilateral trendelenburg, decreased hip extension beyond midstance    Standing Assessment:   SL Stance: mild hip drop  DL Squat: wnl  Standing Lumbar Mobility: moderate limitation/tightness    Supine Movement Assessment   SLR: mild doming/pelvic rocking  Bridge: wnl  Hip PROM: wnl  MMT: sup hip:  4/5 bilaterally    Supine Mobility Assessment  - Hamstrings: moderate tightness  - Piriformis: mild tightness    Sidelying External Palpation:   Piriformis: L tightness/tenderness (strain)    Prone Palpation:   Lumbar Paraspinals: hypertonicity  PA lumbar mobility: hypomobility    Bladder Diary:   Voids: 6-9/day  Urge: 2-3 (mostly 3)  Seconds: 4-12 (mostly 4-8)    Treatments:  - discussed stress, nervous system, and pelvic floor/bladder  - discussed bladder health: no hovering, squatty potty, sit for 3 minutes, breathing - continue  - urge suppression (continued emphasis on: calm & kegel)  - discussed continue 50-65 fl oz /day  - bladder schedule: peeing every 3-4 hours  - reviewed healthy bladder habits  - discussed inflammatory oils (highly processed oils, high in omega-6s) vs. Anti-inflammatory oils (high in omega 3s)  - discussed the stress response and difficulty with feeling present, creative, adventurous  - discussed thoracic rib hump, possible mild scoliosis and PCP follow-up with possible imaging and PT referral    STRETCHING Exercises: Pelvic Floor Part I - first 4-5 exercises, gently  - Code: [id number]    STRENGTH - emphasis on continued exercises  Access Code: [email or web address]  - Supine Pelvic Tilt  - 3-5 x weekly - 10 reps  - Hooklying Transversus Abdominis Palpation  - 3-5 x weekly - 5-10 reps - 2 breaths hold  - Supine March with Alternating Leg Lifts  - 3-5 x weekly - 3 sets - 10 reps  - Supine Hip Adduction Isometric with Ball  - 3-5 x weekly - 3 sets - 10 reps  - Hooklying Clamshell with Resistance  - 3-5 x weekly - 3 sets - 10 reps  - Bridge  - 3-5 x weekly - 3 sets - 10 reps    Plan for Next Visit:  - urge suppression technique  - PF/hip/deep core strengthening  - behavioral training: reduce fluid intake, avoid JICing    Assessment: Patient presents with signs and symptoms consistent with urinary urge incontinence, urgency, frequency, resulting in limited participation in pain-free ADLs and inability to  perform at their prior level of function. Pt would benefit from physical therapy to address the impairments found & listed previously in the objective section in order to return to safe and pain-free ADLs and prior level of function.  - emphasis on getting back to exercises without pain when feeling better    Plan:   Planned Interventions include: therapeutic exercise, self-care home management, manual therapy, therapeutic activities, gait training, neuromuscular coordination, aquatic therapy  Patient and/or family understands and agrees with goals and plan documented: yes  Frequency: 2x/month  Duration: 3-4 months     Elaina Naylor, PT

## 2024-10-16 ENCOUNTER — TELEPHONE (OUTPATIENT)
Dept: PRIMARY CARE | Facility: CLINIC | Age: 54
End: 2024-10-16
Payer: COMMERCIAL

## 2024-10-16 NOTE — TELEPHONE ENCOUNTER
Pelvic floor therapist =  Elaina @ [facility].    Pt req xray order per message below.

## 2024-10-17 ENCOUNTER — APPOINTMENT (OUTPATIENT)
Dept: GASTROENTEROLOGY | Facility: CLINIC | Age: 54
End: 2024-10-17
Payer: COMMERCIAL

## 2024-10-17 VITALS — WEIGHT: 169 LBS | BODY MASS INDEX: 28.16 KG/M2 | HEIGHT: 65 IN

## 2024-10-17 DIAGNOSIS — R93.5 ABNORMAL CT OF THE ABDOMEN: ICD-10-CM

## 2024-10-17 DIAGNOSIS — K57.32 SIGMOID DIVERTICULITIS: ICD-10-CM

## 2024-10-17 DIAGNOSIS — R10.11 RUQ PAIN: ICD-10-CM

## 2024-10-17 DIAGNOSIS — K62.5 RECTAL BLEEDING: ICD-10-CM

## 2024-10-17 DIAGNOSIS — K57.31 DIVERTICULAR HEMORRHAGE: ICD-10-CM

## 2024-10-17 DIAGNOSIS — K57.32 SIGMOID DIVERTICULITIS: Primary | ICD-10-CM

## 2024-10-17 PROCEDURE — 3008F BODY MASS INDEX DOCD: CPT | Performed by: INTERNAL MEDICINE

## 2024-10-17 PROCEDURE — 99204 OFFICE O/P NEW MOD 45 MIN: CPT | Performed by: INTERNAL MEDICINE

## 2024-10-17 RX ORDER — SODIUM PICOSULFATE, MAGNESIUM OXIDE, AND ANHYDROUS CITRIC ACID 12; 3.5; 1 G/175ML; G/175ML; MG/175ML
1 LIQUID ORAL SEE ADMIN INSTRUCTIONS
Qty: 350 ML | Refills: 0 | Status: SHIPPED | OUTPATIENT
Start: 2024-10-17

## 2024-10-17 NOTE — LETTER
October 17, 2024     Brittany Behm, DO  [address] [facility], [place] [address]  [facility] [address]    Patient: Evi Higgins   YOB: 1970   Date of Visit: 10/17/2024       Dear Dr. Brittany Behm, DO:    Thank you for referring Evi Higgins to me for evaluation. Below are my notes for this consultation.  If you have questions, please do not hesitate to call me. I look forward to following your patient along with you.       Sincerely,     Sha Bear MD, MS      CC: No Recipients  ______________________________________________________________________________________    Primary Care Provider: Brittany Behm, DO  Referring Provider: Behm, Brittany, DO  My final recommendations will be communicated back to the referring physician and/or primary care provider via shared medical records or via US mail.    Chief Complaint (Reason for visit):   Evi Higgins is a 53 y.o. female who presents for diverticulitis    ASSESSMENT AND PLAN     Assessment & Plan  Sigmoid diverticulitis  Patient is feeling better with antibiotic treatment  I will schedule her for a colonoscopy 6 to 8 weeks out    I had a long discussion with patient about diverticulosis, diverticulitis, prevention of diverticulitis, various foods that she can eat and cannot eat    Orders:  •  Colonoscopy Diagnostic; Future    Abnormal CT of the abdomen    Orders:  •  Colonoscopy Diagnostic; Future    Rectal bleeding    Orders:  •  Referral to Gastroenterology  •  Colonoscopy Diagnostic; Future      I discussed the risks, benefits and alternative(s) of the procedure(s) with the patient. Pt verbalizes understanding and is willing to proceed. All questions were answered.    Sha Bear MD, MS  10/17/2024    SUBJECTIVE   HPI: History obtained from patient    Pt was at a [employer] game 2 weeks ago  She ate hot dog and peanuts  Started having abdominal pain.  This was followed by some loose stools and blood in stool    Went and  saw her primary care doctor, Brittany Behm, DO  Appropriately, a stat CT scan was ordered   CT showed mild sigmoid diverticulitis  Prescribed Abx  Patient is now feeling a lot better.  Her pain has almost resolved.  No blood in stool    Patient endorses being constipated for a week before  She was drinking the less water than usual    2016-last colonoscopy-no polyps, diverticulosis, hemorrhoids  Patient's sister has history of polyps at a young age      Past Medical History:   Diagnosis Date   • Abdominal pain 02/16/2023   • Abnormal finding on mammography 02/16/2023   • Abnormal uterine bleeding (AUB) 02/16/2023   • Acute bronchitis 02/16/2023   • Acute respiratory disease 02/16/2023   • Acute URI 02/16/2023   • Allergic Pollon as a child   • Anxiety Recently on/off   • Atopic dermatitis 02/16/2023   • Calculus of kidney     Calcium kidney stones   • Constipation 02/16/2023   • Cough 02/16/2023   • Dermatitis 02/16/2023   • Disorder of lipoprotein metabolism, unspecified     Borderline high cholesterol   • Dysuria 02/16/2023   • Encounter for pregnancy test, result negative     Pregnancy examination or test, negative result   • Encounter for screening for malignant neoplasm of cervix 03/07/2022    Encounter for screening for malignant neoplasm of cervix   • Hematuria 02/16/2023   • History of benign breast biopsy 02/16/2023   • Left knee pain 02/16/2023   • Liver lesion 02/16/2023   • Mastodynia 05/01/2018    Painful lumpy right breast   • Menometrorrhagia 02/16/2023   • Microscopic hematuria 02/16/2023   • Missed menses 02/16/2023   • Multiple births, unspecified, all liveborn ([facility])     Multiple births, all liveborn   • Other conditions influencing health status     Menstruation   • Other specified disorders of breast     Pseudoangiomatous stromal hyperplasia of breast   • Pain of finger 02/16/2023   • Pelvic pain 02/16/2023   • Personal history of other diseases of the female genital tract 01/21/2019     "History of breast pain   • Personal history of other diseases of the respiratory system 11/15/2017    History of acute sinusitis   • Personal history of other infectious and parasitic diseases     History of varicella   • Personal history of other medical treatment     History of mammogram   • Pharyngitis 02/16/2023   • Renal calculi 02/16/2023   • Right knee pain 02/16/2023   • Thumb pain 02/16/2023   • Unspecified disorder of nose and nasal sinuses     Sinus problem   • Urgency of urination     Urinary urgency   • Varicella A child   • Yeast infection 02/16/2023       Past Surgical History:   Procedure Laterality Date   • BREAST BIOPSY  12/18/2017    Biopsy Breast Percutaneous Needle Core   • COLONOSCOPY  04/19/2013    Complete Colonoscopy   • COLONOSCOPY  04/04/2014    Colonoscopy (Fiberoptic)   • MOUTH SURGERY  04/04/2014    Oral Surgery Tooth Extraction   • OTHER SURGICAL HISTORY  03/10/2014    Dental Surgery       Current Outpatient Medications   Medication Sig Dispense Refill   • cholecalciferol (Vitamin D-3) 50 MCG (2000 UT) tablet Take 2 tablets (4,000 Units) by mouth once daily. 60 tablet 11   • estradioL (Imvexxy Maintenance Pack) 4 mcg insert Insert one vaginal insert twice weekly 24 each 3     No current facility-administered medications for this visit.       Allergies   Allergen Reactions   • Ciprofloxacin Nausea Only, Other, Unknown, GI Upset and Nausea/vomiting     Other reaction(s): Other   • Clarithromycin Nausea Only, Other, Unknown, GI Upset and Nausea/vomiting     Other reaction(s): GI Intolerance, Other   • Erythromycin Nausea Only, Other, Unknown, GI Upset and Nausea/vomiting     Other reaction(s): GI Intolerance, Other   • Ofloxacin Nausea Only, Other, Unknown and GI Upset     Other reaction(s): Other, Vomiting   • Tetracycline Unknown   • Tetracyclines Unknown     OBJECTIVE   PHYSICAL EXAM:  Ht 1.651 m (5' 5\")   Wt 76.7 kg (169 lb)   LMP 04/01/2022   BMI 28.12 kg/m²  "     LABS/IMAGING/SCOPES  Lab Results   Component Value Date    WBC 4.6 10/08/2024    HGB 15.5 10/08/2024    HCT 42.6 10/08/2024    MCV 90 10/08/2024     10/08/2024     Lab Results   Component Value Date    GLUCOSE 99 10/08/2024    CALCIUM 9.4 10/08/2024     10/08/2024    K 4.0 10/08/2024    CO2 32 10/08/2024     10/08/2024    BUN 18 10/08/2024    CREATININE 0.72 10/08/2024     Lab Results   Component Value Date    ALT 18 10/08/2024    AST 15 10/08/2024    ALKPHOS 86 10/08/2024    BILITOT 0.8 10/08/2024       === 10/08/24 ===    CT ABDOMEN PELVIS W IV CONTRAST    - Impression -  Mild bowel wall thickening the sigmoid colon may be due to lack of  distention or colitis depending on the clinical setting.    Right-sided colonic diverticula.    Small hiatal hernia.    Possible uterine fibroid. Pelvic ultrasound may further assess as  warranted.    Signed by: Gonsalo Smiley 10/8/2024 12:06 PM  Dictation workstation:   [id number]    Sha Bear MD, MS  10/17/2024

## 2024-10-17 NOTE — PROGRESS NOTES
Primary Care Provider: Brittany Behm, DO  Referring Provider: Behm, Brittany, DO  My final recommendations will be communicated back to the referring physician and/or primary care provider via shared medical records or via US mail.    Chief Complaint (Reason for visit):   Evi Higgins is a 53 y.o. female who presents for diverticulitis    ASSESSMENT AND PLAN     Assessment & Plan  Sigmoid diverticulitis  Patient is feeling better with antibiotic treatment  I will schedule her for a colonoscopy 6 to 8 weeks out    I had a long discussion with patient about diverticulosis, diverticulitis, prevention of diverticulitis, various foods that she can eat and cannot eat    Orders:    Colonoscopy Diagnostic; Future    Abnormal CT of the abdomen    Orders:    Colonoscopy Diagnostic; Future    Rectal bleeding    Orders:    Referral to Gastroenterology    Colonoscopy Diagnostic; Future      I discussed the risks, benefits and alternative(s) of the procedure(s) with the patient. Pt verbalizes understanding and is willing to proceed. All questions were answered.    Sha Bear MD, MS  10/17/2024    SUBJECTIVE   HPI: History obtained from patient    Pt was at a [employer] game 2 weeks ago  She ate hot dog and peanuts  Started having abdominal pain.  This was followed by some loose stools and blood in stool    Went and saw her primary care doctor, Brittany Behm, DO  Appropriately, a stat CT scan was ordered   CT showed mild sigmoid diverticulitis  Prescribed Abx  Patient is now feeling a lot better.  Her pain has almost resolved.  No blood in stool    Patient endorses being constipated for a week before  She was drinking the less water than usual    2016-last colonoscopy-no polyps, diverticulosis, hemorrhoids  Patient's sister has history of polyps at a young age      Past Medical History:   Diagnosis Date    Abdominal pain 02/16/2023    Abnormal finding on mammography 02/16/2023    Abnormal uterine bleeding (AUB) 02/16/2023     Acute bronchitis 02/16/2023    Acute respiratory disease 02/16/2023    Acute URI 02/16/2023    Allergic Pollon as a child    Anxiety Recently on/off    Atopic dermatitis 02/16/2023    Calculus of kidney     Calcium kidney stones    Constipation 02/16/2023    Cough 02/16/2023    Dermatitis 02/16/2023    Disorder of lipoprotein metabolism, unspecified     Borderline high cholesterol    Dysuria 02/16/2023    Encounter for pregnancy test, result negative     Pregnancy examination or test, negative result    Encounter for screening for malignant neoplasm of cervix 03/07/2022    Encounter for screening for malignant neoplasm of cervix    Hematuria 02/16/2023    History of benign breast biopsy 02/16/2023    Left knee pain 02/16/2023    Liver lesion 02/16/2023    Mastodynia 05/01/2018    Painful lumpy right breast    Menometrorrhagia 02/16/2023    Microscopic hematuria 02/16/2023    Missed menses 02/16/2023    Multiple births, unspecified, all liveborn ([facility])     Multiple births, all liveborn    Other conditions influencing health status     Menstruation    Other specified disorders of breast     Pseudoangiomatous stromal hyperplasia of breast    Pain of finger 02/16/2023    Pelvic pain 02/16/2023    Personal history of other diseases of the female genital tract 01/21/2019    History of breast pain    Personal history of other diseases of the respiratory system 11/15/2017    History of acute sinusitis    Personal history of other infectious and parasitic diseases     History of varicella    Personal history of other medical treatment     History of mammogram    Pharyngitis 02/16/2023    Renal calculi 02/16/2023    Right knee pain 02/16/2023    Thumb pain 02/16/2023    Unspecified disorder of nose and nasal sinuses     Sinus problem    Urgency of urination     Urinary urgency    Varicella A child    Yeast infection 02/16/2023       Past Surgical History:   Procedure Laterality Date    BREAST BIOPSY  12/18/2017    Biopsy  "Breast Percutaneous Needle Core    COLONOSCOPY  04/19/2013    Complete Colonoscopy    COLONOSCOPY  04/04/2014    Colonoscopy (Fiberoptic)    MOUTH SURGERY  04/04/2014    Oral Surgery Tooth Extraction    OTHER SURGICAL HISTORY  03/10/2014    Dental Surgery       Current Outpatient Medications   Medication Sig Dispense Refill    cholecalciferol (Vitamin D-3) 50 MCG (2000 UT) tablet Take 2 tablets (4,000 Units) by mouth once daily. 60 tablet 11    estradioL (Imvexxy Maintenance Pack) 4 mcg insert Insert one vaginal insert twice weekly 24 each 3     No current facility-administered medications for this visit.       Allergies   Allergen Reactions    Ciprofloxacin Nausea Only, Other, Unknown, GI Upset and Nausea/vomiting     Other reaction(s): Other    Clarithromycin Nausea Only, Other, Unknown, GI Upset and Nausea/vomiting     Other reaction(s): GI Intolerance, Other    Erythromycin Nausea Only, Other, Unknown, GI Upset and Nausea/vomiting     Other reaction(s): GI Intolerance, Other    Ofloxacin Nausea Only, Other, Unknown and GI Upset     Other reaction(s): Other, Vomiting    Tetracycline Unknown    Tetracyclines Unknown     OBJECTIVE   PHYSICAL EXAM:  Ht 1.651 m (5' 5\")   Wt 76.7 kg (169 lb)   LMP 04/01/2022   BMI 28.12 kg/m²      LABS/IMAGING/SCOPES  Lab Results   Component Value Date    WBC 4.6 10/08/2024    HGB 15.5 10/08/2024    HCT 42.6 10/08/2024    MCV 90 10/08/2024     10/08/2024     Lab Results   Component Value Date    GLUCOSE 99 10/08/2024    CALCIUM 9.4 10/08/2024     10/08/2024    K 4.0 10/08/2024    CO2 32 10/08/2024     10/08/2024    BUN 18 10/08/2024    CREATININE 0.72 10/08/2024     Lab Results   Component Value Date    ALT 18 10/08/2024    AST 15 10/08/2024    ALKPHOS 86 10/08/2024    BILITOT 0.8 10/08/2024       === 10/08/24 ===    CT ABDOMEN PELVIS W IV CONTRAST    - Impression -  Mild bowel wall thickening the sigmoid colon may be due to lack of  distention or colitis " depending on the clinical setting.    Right-sided colonic diverticula.    Small hiatal hernia.    Possible uterine fibroid. Pelvic ultrasound may further assess as  warranted.    Signed by: Gonsalo Smiley 10/8/2024 12:06 PM  Dictation workstation:   [id number]    Sha Bear MD, MS  10/17/2024

## 2024-11-06 ENCOUNTER — HOSPITAL ENCOUNTER (OUTPATIENT)
Dept: RADIOLOGY | Facility: CLINIC | Age: 54
Discharge: HOME | End: 2024-11-06
Payer: COMMERCIAL

## 2024-11-06 ENCOUNTER — APPOINTMENT (OUTPATIENT)
Dept: PRIMARY CARE | Facility: CLINIC | Age: 54
End: 2024-11-06
Payer: COMMERCIAL

## 2024-11-06 VITALS
WEIGHT: 169 LBS | SYSTOLIC BLOOD PRESSURE: 112 MMHG | RESPIRATION RATE: 16 BRPM | DIASTOLIC BLOOD PRESSURE: 84 MMHG | TEMPERATURE: 98.6 F | OXYGEN SATURATION: 97 % | BODY MASS INDEX: 28.16 KG/M2 | HEART RATE: 70 BPM | HEIGHT: 65 IN

## 2024-11-06 DIAGNOSIS — K57.92 DIVERTICULITIS: ICD-10-CM

## 2024-11-06 DIAGNOSIS — M41.9 SCOLIOSIS, UNSPECIFIED SCOLIOSIS TYPE, UNSPECIFIED SPINAL REGION: Primary | ICD-10-CM

## 2024-11-06 DIAGNOSIS — M41.9 SCOLIOSIS, UNSPECIFIED SCOLIOSIS TYPE, UNSPECIFIED SPINAL REGION: ICD-10-CM

## 2024-11-06 DIAGNOSIS — L60.8 CHANGE IN NAIL APPEARANCE: ICD-10-CM

## 2024-11-06 PROCEDURE — 1036F TOBACCO NON-USER: CPT | Performed by: FAMILY MEDICINE

## 2024-11-06 PROCEDURE — 72072 X-RAY EXAM THORAC SPINE 3VWS: CPT | Performed by: RADIOLOGY

## 2024-11-06 PROCEDURE — 72072 X-RAY EXAM THORAC SPINE 3VWS: CPT

## 2024-11-06 PROCEDURE — 3008F BODY MASS INDEX DOCD: CPT | Performed by: FAMILY MEDICINE

## 2024-11-06 PROCEDURE — 99214 OFFICE O/P EST MOD 30 MIN: CPT | Performed by: FAMILY MEDICINE

## 2024-11-06 RX ORDER — ECONAZOLE NITRATE 10 MG/G
CREAM TOPICAL DAILY PRN
COMMUNITY
Start: 2024-10-11

## 2024-11-06 NOTE — PROGRESS NOTES
"Subjective   Evi Higgins is a 53 y.o. female who presents for Follow-up (2-4 week follow up ), Nail Problem (White spots on nail bed on toes and fingers ), and Post Menopause (Heart rate discussion).  HPI    Dx sig diverticulitis last mo, getting c-scope for follow up. Has more pe    White spots on fingernail and toenails for 3-4 mo. No yellow thickness or brittle.     Hump in mid back, PT thinks scoliosis. Would like to get xray to know severity and targeted PT exercises     Current Outpatient Medications on File Prior to Visit   Medication Sig Dispense Refill    cholecalciferol (Vitamin D-3) 50 MCG (2000 UT) tablet Take 2 tablets (4,000 Units) by mouth once daily. 60 tablet 11    econazole nitrate 1 % cream Apply topically once daily as needed.      estradioL (Imvexxy Maintenance Pack) 4 mcg insert Insert one vaginal insert twice weekly 24 each 3    sod picosulf-mag ox-citric ac (Clenpiq) 10 mg-3.5 gram- 12 gram/175 mL solution Take 1 Box by mouth see administration instructions. Starting at 6pm night before procedure drink 1 bottle as per instructions, then 5 hours before procedure time drink 2nd as instructed 350 mL 0     No current facility-administered medications on file prior to visit.                  Objective   /84   Pulse 70   Temp 37 °C (98.6 °F)   Resp 16   Ht 1.651 m (5' 5\")   Wt 76.7 kg (169 lb)   LMP 04/01/2022   SpO2 97%   BMI 28.12 kg/m²    Physical Exam  General: NAD  HEENT:NCAT, PERRLA, nml OP  Neck: no cervical TOSHIA  Heart: RRR no murmur, no edema   Lungs: CTA b/l, no wheeze or rhonchi   GI: abd soft, nontender, nondistended.   MSK: no c/c/e. nml gait   R T spine curvature w spine flexion   Skin: warm and dry  Psych: cooperative, appropriate affect  Neuro: speech clear. A&Ox3  Assessment/Plan   Problem List Items Addressed This Visit       Scoliosis - Primary  T spine xrays  Will help guide PT for known scoliosis        Other Visit Diagnoses       Change in nail appearance    "   Has polish on today so cannot eval but would like to see new derm anyway. RF Dr Patel       Diverticulitis      Resolved w abx  Discussed inc fiber  F/up c-scope as bhavna

## 2024-11-27 ENCOUNTER — TREATMENT (OUTPATIENT)
Dept: PHYSICAL THERAPY | Facility: CLINIC | Age: 54
End: 2024-11-27
Payer: COMMERCIAL

## 2024-11-27 DIAGNOSIS — R39.15 URINARY URGENCY: ICD-10-CM

## 2024-11-27 DIAGNOSIS — R10.2 PELVIC PAIN: ICD-10-CM

## 2024-11-27 PROCEDURE — 97110 THERAPEUTIC EXERCISES: CPT | Mod: GP | Performed by: PHYSICAL THERAPIST

## 2024-11-27 PROCEDURE — 97535 SELF CARE MNGMENT TRAINING: CPT | Mod: GP | Performed by: PHYSICAL THERAPIST

## 2024-11-27 NOTE — PROGRESS NOTES
Physical Therapy  Physical Therapy Pelvic Floor    Name: Evi Higgins  MRN: [id number]  : 1970  Today's Date: 24     Time Calculation  Start Time: 0900  Stop Time: 945  Time Calculation (min): 45 min    Insurance: MM, no [facility]  Visit # 6 of 40 for     Current Problem:  1. Urinary urgency  Follow Up In Physical Therapy      2. Pelvic pain  Follow Up In Physical Therapy          General     Precautions     Vital Signs     Pain       Subjective  Chief Complaint: urinary urgency/incontinence (mad dash)  - throughout her entire life, worsening now with age  - limiting ability to travel  - restricting fluids/water + always thirsty + prone to kidney stones  - will pee and have to pee again very quickly  - told she has a really strong stream  - lots of travel to care for mom in [place]  Onset: youth  TORY: unknown    10/15/24:   - spastic colon episode, flare of diverticulosis  - no exercise due to 3 recent illnesses  - L hip pain very mild, and only at times, such as sitting on hip for prolonged periods  - hep compliance: no with being sick  - noticed postural changes and a bump on her back/thoracic area, no pain, but can't lie flat    Today (24):   - monitoring water intake throughout the day (32fl oz x2-2.5)  - less urgency and leaking    PAIN  Intensity (0-10): 0  Location:   Description:     GYN: Painful [place] or vaginal penetration? Yes, since menopause (decreased arousal) - using lubricant  BLADDER: pelvic floor tightness  Daytime: every couple of hours (full school day, go 2x/day, 4d/wk)  Urinary Incontinence/Leakage: Yes - urge  Average Fluid Intake (glasses/day): at times, Subconsciously drinking water, Over-drinking    Objective  Posture: forward head, rounded shoulders, increased thoracic kyphosis  Gait: bilateral trendelenburg, decreased hip extension beyond midstance    Standing Assessment:   SL Stance: mild hip drop  DL Squat: wnl  Standing Lumbar Mobility: moderate  limitation/tightness    Supine Movement Assessment   SLR: mild doming/pelvic rocking  Bridge: wnl  Hip PROM: wnl  MMT: sup hip: 4/5 bilaterally    Supine Mobility Assessment  - Hamstrings: moderate tightness  - Piriformis: mild tightness    Sidelying External Palpation:   Piriformis: L tightness/tenderness (strain)    Prone Palpation:   Lumbar Paraspinals: hypertonicity  PA lumbar mobility: hypomobility    Bladder Diary:   Voids: 6-9/day  Urge: 2-3 (mostly 3)  Seconds: 4-12 (mostly 4-8)    Treatments:  - discussed stress, nervous system, and pelvic floor/bladder  - discussed bladder health: no hovering, squatty potty, sit for 3 minutes, breathing - continue  - urge suppression (continued emphasis on: calm & kegel)  - discussed continue 50-65 fl oz /day  - bladder schedule: peeing every 3-4 hours  - reviewed healthy bladder habits  - discussed inflammatory oils (highly processed oils, high in omega-6s) vs. Anti-inflammatory oils (high in omega 3s)  - discussed the stress response and difficulty with feeling present, creative, adventurous  - discussed thoracic rib hump, possible mild scoliosis and PCP follow-up with possible imaging and PT referral    STRETCHING Exercises: Pelvic Floor Part I - first 4-5 exercises, gently  - Code: [id number]    STRENGTH - emphasis on continued exercises  Access Code: [email or web address]  - Supine Pelvic Tilt  - 3-5 x weekly - 10 reps  - Hooklying Transversus Abdominis Palpation  - 3-5 x weekly - 5-10 reps - 2 breaths hold  - Supine March with Alternating Leg Lifts  - 3-5 x weekly - 3 sets - 10 reps  - Supine Hip Adduction Isometric with Ball  - 3-5 x weekly - 3 sets - 10 reps  - Hooklying Clamshell with Resistance  - 3-5 x weekly - 3 sets - 10 reps  - Bridge  - 3-5 x weekly - 3 sets - 10 reps    Access Code: [email or web address]  - Doorway Pec Stretch at 90 Degrees Abduction  - 1 x daily - 3 sets - 20 seconds hold  - Standing 'L' Stretch at Counter  - 1 x daily - 3 sets - 20 seconds hold  - Supine Chest  Stretch on Foam Roll  - 1 x daily - 3 sets - 1-3 minutes hold  - Thoracic Extension Mobilization on Foam Roll  - 1 x daily - 10 reps - 5-10 seconds hold    Plan for Next Visit:  - PF/hip/deep core strengthening  - behavioral training: reduce fluid intake, avoid JICing    Assessment: Patient presents with signs and symptoms consistent with urinary urge incontinence, urgency, frequency, resulting in limited participation in pain-free ADLs and inability to perform at their prior level of function. Pt would benefit from physical therapy to address the impairments found & listed previously in the objective section in order to return to safe and pain-free ADLs and prior level of function.  - emphasis on continued exercise  - reported improvement in urinary symptoms    Plan:   Planned Interventions include: therapeutic exercise, self-care home management, manual therapy, therapeutic activities, gait training, neuromuscular coordination, aquatic therapy  Patient and/or family understands and agrees with goals and plan documented: yes  Frequency: 2x/month  Duration: 3-4 months     Elaina Naylor, PT

## 2024-12-13 ENCOUNTER — APPOINTMENT (OUTPATIENT)
Dept: GASTROENTEROLOGY | Facility: EXTERNAL LOCATION | Age: 54
End: 2024-12-13
Payer: COMMERCIAL

## 2024-12-13 DIAGNOSIS — R93.5 ABNORMAL CT OF THE ABDOMEN: Primary | ICD-10-CM

## 2024-12-13 DIAGNOSIS — R93.3 ABNORMAL FINDINGS ON DIAGNOSTIC IMAGING OF DIGESTIVE SYSTEM: ICD-10-CM

## 2024-12-13 DIAGNOSIS — K57.32 SIGMOID DIVERTICULITIS: ICD-10-CM

## 2024-12-13 DIAGNOSIS — K62.5 RECTAL BLEEDING: ICD-10-CM

## 2024-12-13 PROCEDURE — 45378 DIAGNOSTIC COLONOSCOPY: CPT | Performed by: INTERNAL MEDICINE

## 2024-12-18 ENCOUNTER — APPOINTMENT (OUTPATIENT)
Dept: SLEEP MEDICINE | Facility: CLINIC | Age: 54
End: 2024-12-18
Payer: COMMERCIAL

## 2024-12-19 ENCOUNTER — TREATMENT (OUTPATIENT)
Dept: PHYSICAL THERAPY | Facility: CLINIC | Age: 54
End: 2024-12-19
Payer: COMMERCIAL

## 2024-12-19 DIAGNOSIS — R39.15 URINARY URGENCY: ICD-10-CM

## 2024-12-19 DIAGNOSIS — R10.2 PELVIC PAIN: ICD-10-CM

## 2024-12-19 PROCEDURE — 97535 SELF CARE MNGMENT TRAINING: CPT | Mod: GP | Performed by: PHYSICAL THERAPIST

## 2024-12-19 PROCEDURE — 97110 THERAPEUTIC EXERCISES: CPT | Mod: GP | Performed by: PHYSICAL THERAPIST

## 2024-12-19 NOTE — PROGRESS NOTES
Physical Therapy  Physical Therapy Pelvic Floor    Name: Evi Higgins  MRN: [id number]  : 1970  Today's Date: 24     Time Calculation  Start Time: 0900  Stop Time: 1000  Time Calculation (min): 60 min    Insurance: MMO, no [facility]  Visit # 7 of 40 for     Current Problem:  1. Urinary urgency  Follow Up In Physical Therapy      2. Pelvic pain  Follow Up In Physical Therapy          General     Precautions     Vital Signs     Pain       Subjective  Chief Complaint: urinary urgency/incontinence (mad dash)  - throughout her entire life, worsening now with age  - limiting ability to travel  - restricting fluids/water + always thirsty + prone to kidney stones  - will pee and have to pee again very quickly  - told she has a really strong stream  - lots of travel to care for mom in [place]  Onset: youth  TORY: unknown    10/15/24:   - spastic colon episode, flare of diverticulosis  - no exercise due to 3 recent illnesses  - L hip pain very mild, and only at times, such as sitting on hip for prolonged periods  - hep compliance: no with being sick  - noticed postural changes and a bump on her back/thoracic area, no pain, but can't lie flat    24:   - monitoring water intake throughout the day (32fl oz x2-2.5)  - less urgency and leaking    24:   - improved water intake  - compliance with hep/    PAIN  Intensity (0-10): 0  Location:   Description:     GYN: Painful Sisters or vaginal penetration? Yes, since menopause (decreased arousal) - using lubricant  BLADDER: pelvic floor tightness  Daytime: every couple of hours (full school day, go 2x/day, 4d/wk)  Urinary Incontinence/Leakage: Yes - urge  Average Fluid Intake (glasses/day): at times, Subconsciously drinking water, Over-drinking    Objective  Posture: forward head, rounded shoulders, increased thoracic kyphosis  Gait: bilateral trendelenburg, decreased hip extension beyond midstance    Standing Assessment:   SL Stance: mild  hip drop  DL Squat: wnl  Standing Lumbar Mobility: moderate limitation/tightness    Supine Movement Assessment   SLR: mild doming/pelvic rocking  Bridge: wnl  Hip PROM: wnl  MMT: sup hip: 4/5 bilaterally    Supine Mobility Assessment  - Hamstrings: moderate tightness  - Piriformis: mild tightness    Sidelying External Palpation:   Piriformis: L tightness/tenderness (strain)    Prone Palpation:   Lumbar Paraspinals: hypertonicity  PA lumbar mobility: hypomobility    Bladder Diary:   Voids: 6-9/day  Urge: 2-3 (mostly 3)  Seconds: 4-12 (mostly 4-8)    Treatments:  - discussed stress, nervous system, and pelvic floor/bladder  - discussed bladder health: no hovering, squatty potty, sit for 3 minutes, breathing - continue  - urge suppression (continued emphasis on: calm & kegel)  - discussed continue 50-65 fl oz /day  - bladder schedule: peeing every 3-4 hours  - reviewed healthy bladder habits  - discussed inflammatory oils (highly processed oils, high in omega-6s) vs. Anti-inflammatory oils (high in omega 3s)  - discussed the stress response and difficulty with feeling present, creative, adventurous  - discussed thoracic rib hump, possible mild scoliosis and PCP follow-up with possible imaging and PT referral    STRETCHING Exercises: Pelvic Floor Part I - first 4-5 exercises, gently  - Code: [id number]    STRENGTH - emphasis on continued exercises  Access Code: [email or web address]   - Supine Pelvic Tilt  - 3-5 x weekly - 10 reps  - Hooklying Transversus Abdominis Palpation  - 3-5 x weekly - 5-10 reps - 2 breaths hold  - Supine March with Alternating Leg Lifts  - 3-5 x weekly - 3 sets - 10 reps  - Supine Hip Adduction Isometric with Ball  - 3-5 x weekly - 3 sets - 10 reps  - Hooklying Clamshell with Resistance  - 3-5 x weekly - 3 sets - 10 reps  - Bridge  - 3-5 x weekly - 3 sets - 10 reps    Access Code: [email or web address]  - Doorway Pec Stretch at 90 Degrees Abduction  - 1 x daily - 3 sets - 20 seconds hold  - Standing 'L' Stretch at Counter   - 1 x daily - 3 sets - 20 seconds hold  - Supine Chest Stretch on Foam Roll  - 1 x daily - 3 sets - 1-3 minutes hold  - Thoracic Extension Mobilization on Foam Roll  - 1 x daily - 10 reps - 5-10 seconds hold    Plan for Next Visit:  - PF/hip/deep core strengthening  - behavioral training: reduce fluid intake, avoid JICing    Assessment: Patient presents with signs and symptoms consistent with urinary urge incontinence, urgency, frequency, resulting in limited participation in pain-free ADLs and inability to perform at their prior level of function. Pt would benefit from physical therapy to address the impairments found & listed previously in the objective section in order to return to safe and pain-free ADLs and prior level of function.  - emphasis on continued exercise  - reported improvement in urinary symptoms    Plan:   Planned Interventions include: therapeutic exercise, self-care home management, manual therapy, therapeutic activities, gait training, neuromuscular coordination, aquatic therapy  Patient and/or family understands and agrees with goals and plan documented: yes  Frequency: 2x/month  Duration: 3-4 months     Elaina Naylor, PT

## 2025-03-06 ENCOUNTER — APPOINTMENT (OUTPATIENT)
Dept: PHYSICAL THERAPY | Facility: CLINIC | Age: 55
End: 2025-03-06
Payer: COMMERCIAL

## 2025-03-23 LAB
25(OH)D3+25(OH)D2 SERPL-MCNC: 39 NG/ML (ref 30–100)
ALBUMIN SERPL-MCNC: 4.3 G/DL (ref 3.6–5.1)
ALP SERPL-CCNC: 80 U/L (ref 37–153)
ALT SERPL-CCNC: 21 U/L (ref 6–29)
ANION GAP SERPL CALCULATED.4IONS-SCNC: 8 MMOL/L (CALC) (ref 7–17)
AST SERPL-CCNC: 17 U/L (ref 10–35)
BASOPHILS # BLD AUTO: 29 CELLS/UL (ref 0–200)
BASOPHILS NFR BLD AUTO: 0.7 %
BILIRUB SERPL-MCNC: 0.5 MG/DL (ref 0.2–1.2)
BUN SERPL-MCNC: 14 MG/DL (ref 7–25)
CALCIUM SERPL-MCNC: 9.1 MG/DL (ref 8.6–10.4)
CHLORIDE SERPL-SCNC: 103 MMOL/L (ref 98–110)
CHOLEST SERPL-MCNC: 278 MG/DL
CHOLEST/HDLC SERPL: 5.1 (CALC)
CO2 SERPL-SCNC: 29 MMOL/L (ref 20–32)
CREAT SERPL-MCNC: 0.62 MG/DL (ref 0.5–1.03)
EGFRCR SERPLBLD CKD-EPI 2021: 105 ML/MIN/1.73M2
EOSINOPHIL # BLD AUTO: 168 CELLS/UL (ref 15–500)
EOSINOPHIL NFR BLD AUTO: 4.1 %
ERYTHROCYTE [DISTWIDTH] IN BLOOD BY AUTOMATED COUNT: 12.8 % (ref 11–15)
EST. AVERAGE GLUCOSE BLD GHB EST-MCNC: 114 MG/DL
EST. AVERAGE GLUCOSE BLD GHB EST-SCNC: 6.3 MMOL/L
GLUCOSE SERPL-MCNC: 92 MG/DL (ref 65–99)
HBA1C MFR BLD: 5.6 % OF TOTAL HGB
HCT VFR BLD AUTO: 42.2 % (ref 35–45)
HDLC SERPL-MCNC: 54 MG/DL
HGB BLD-MCNC: 14.2 G/DL (ref 11.7–15.5)
LDLC SERPL CALC-MCNC: 179 MG/DL (CALC)
LYMPHOCYTES # BLD AUTO: 1607 CELLS/UL (ref 850–3900)
LYMPHOCYTES NFR BLD AUTO: 39.2 %
MCH RBC QN AUTO: 29.9 PG (ref 27–33)
MCHC RBC AUTO-ENTMCNC: 33.6 G/DL (ref 32–36)
MCV RBC AUTO: 88.8 FL (ref 80–100)
MONOCYTES # BLD AUTO: 279 CELLS/UL (ref 200–950)
MONOCYTES NFR BLD AUTO: 6.8 %
NEUTROPHILS # BLD AUTO: 2017 CELLS/UL (ref 1500–7800)
NEUTROPHILS NFR BLD AUTO: 49.2 %
NONHDLC SERPL-MCNC: 224 MG/DL (CALC)
PLATELET # BLD AUTO: 267 THOUSAND/UL (ref 140–400)
PMV BLD REES-ECKER: 11.2 FL (ref 7.5–12.5)
POTASSIUM SERPL-SCNC: 4.4 MMOL/L (ref 3.5–5.3)
PROT SERPL-MCNC: 7.1 G/DL (ref 6.1–8.1)
RBC # BLD AUTO: 4.75 MILLION/UL (ref 3.8–5.1)
SODIUM SERPL-SCNC: 140 MMOL/L (ref 135–146)
TRIGL SERPL-MCNC: 250 MG/DL
TSH SERPL-ACNC: 1.49 MIU/L
WBC # BLD AUTO: 4.1 THOUSAND/UL (ref 3.8–10.8)

## 2025-04-04 ENCOUNTER — APPOINTMENT (OUTPATIENT)
Dept: PRIMARY CARE | Facility: CLINIC | Age: 55
End: 2025-04-04
Payer: COMMERCIAL

## 2025-04-16 DIAGNOSIS — N95.2 ATROPHIC VAGINITIS: ICD-10-CM

## 2025-04-17 RX ORDER — ESTRADIOL 4 UG/1
INSERT VAGINAL
Qty: 24 EACH | Refills: 3 | Status: SHIPPED | OUTPATIENT
Start: 2025-04-17

## 2025-05-09 ENCOUNTER — APPOINTMENT (OUTPATIENT)
Dept: PRIMARY CARE | Facility: CLINIC | Age: 55
End: 2025-05-09
Payer: COMMERCIAL

## 2025-05-09 VITALS
TEMPERATURE: 97.7 F | SYSTOLIC BLOOD PRESSURE: 130 MMHG | DIASTOLIC BLOOD PRESSURE: 96 MMHG | HEART RATE: 80 BPM | HEIGHT: 65 IN | WEIGHT: 173.2 LBS | RESPIRATION RATE: 16 BRPM | OXYGEN SATURATION: 98 % | BODY MASS INDEX: 28.86 KG/M2

## 2025-05-09 DIAGNOSIS — R15.2 FECAL URGENCY: ICD-10-CM

## 2025-05-09 DIAGNOSIS — R03.0 BORDERLINE HYPERTENSION: ICD-10-CM

## 2025-05-09 DIAGNOSIS — M25.562 LEFT KNEE PAIN, UNSPECIFIED CHRONICITY: ICD-10-CM

## 2025-05-09 DIAGNOSIS — Z00.00 ENCOUNTER FOR ANNUAL PHYSICAL EXAM: Primary | ICD-10-CM

## 2025-05-09 DIAGNOSIS — R06.83 SNORING: ICD-10-CM

## 2025-05-09 DIAGNOSIS — E78.5 HYPERLIPIDEMIA, UNSPECIFIED HYPERLIPIDEMIA TYPE: ICD-10-CM

## 2025-05-09 DIAGNOSIS — I83.90 VARICOSE VEINS OF LOWER EXTREMITY, UNSPECIFIED LATERALITY, UNSPECIFIED WHETHER COMPLICATED: ICD-10-CM

## 2025-05-09 PROCEDURE — 99214 OFFICE O/P EST MOD 30 MIN: CPT | Performed by: FAMILY MEDICINE

## 2025-05-09 PROCEDURE — 3008F BODY MASS INDEX DOCD: CPT | Performed by: FAMILY MEDICINE

## 2025-05-09 PROCEDURE — 99396 PREV VISIT EST AGE 40-64: CPT | Performed by: FAMILY MEDICINE

## 2025-05-09 NOTE — PROGRESS NOTES
"Subjective   Evi Higgins is a 54 y.o. female who presents for Annual Exam.  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     Recently got a . Trying to find a balance w work/ex     Feels like L thigh is larger    Sore on L lateral knee. Started 1 mo ago.     Still snoring. Saw sleep med in aug. Decided not to do sleep study d/t cost. Nasal spray helps.     BP in 120s when checked recently   Medications Ordered Prior to Encounter[1]               Objective   BP (!) 142/110   Pulse 80   Temp 36.5 °C (97.7 °F) (Temporal)   Resp 16   Ht 1.651 m (5' 5\")   Wt 78.6 kg (173 lb 3.2 oz)   LMP 04/01/2022   SpO2 98%   BMI 28.82 kg/m²    Physical Exam  General: NAD  HEENT:NCAT, PERRLA, nml OP, b/l TM clear   Neck: no cervical TOSHIA  Heart: RRR no murmur, no edema   Lungs: CTA b/l, no wheeze or rhonchi   GI: abd soft, nontender, nondistended.   MSK: no c/c/e. nml gait   L>R LE varicosities   Skin: warm and dry  Psych: cooperative, appropriate affect  Neuro: speech clear. A&Ox3  Assessment/Plan   Problem List Items Addressed This Visit    None  Visit Diagnoses         Encounter for annual physical exam    -  Primary  CPE:overall doing well. Discussed diet/ex changes  BMI: 28  VACC: reviewed shingrix/tdap UTD   COLON CA SCRN: UTD  LABS: reviewed w pt at goal besides aforementioned  PAP: UTD  MAMMO: UTD   DEXA: 65  RTC yearly or prn       Snoring      Rec completeing sleep study. Ordered by sleep med         Hyperlipidemia, unspecified hyperlipidemia type      LDL inc from 120-->170s   Work on TLC  Rpt 3-5 mo   CAC 0 2022  ASCVD 3%   Statin not indicated currently unless LDL does not improve       Relevant Orders    Lipid Panel      Varicose veins of lower extremity, unspecified laterality, unspecified whether complicated      RF to Dr Paige         Fecal urgency      Rec lactaid/prn imodium         Borderline hypertension      Monitor at home   Call if " consistently >140/90                   [1]   Current Outpatient Medications on File Prior to Visit   Medication Sig Dispense Refill    estradioL (Imvexxy Maintenance Pack) 4 mcg insert Insert one vaginal insert twice weekly 24 each 3    [DISCONTINUED] cholecalciferol (Vitamin D-3) 50 MCG (2000 UT) tablet Take 2 tablets (4,000 Units) by mouth once daily. 60 tablet 11    [DISCONTINUED] econazole nitrate 1 % cream Apply topically once daily as needed.      [DISCONTINUED] sod picosulf-mag ox-citric ac (Clenpiq) 10 mg-3.5 gram- 12 gram/175 mL solution Take 1 Box by mouth see administration instructions. Starting at 6pm night before procedure drink 1 bottle as per instructions, then 5 hours before procedure time drink 2nd as instructed 350 mL 0     No current facility-administered medications on file prior to visit.

## 2025-05-12 ENCOUNTER — TELEPHONE (OUTPATIENT)
Dept: SLEEP MEDICINE | Facility: CLINIC | Age: 55
End: 2025-05-12
Payer: COMMERCIAL

## 2025-05-12 NOTE — TELEPHONE ENCOUNTER
Returned patient call that was forwarded to me by Stephania Burciaga RN. LMOM  with procedure code of 37245 and dx  code of G47.33 and phone # of Financial Clearance to run through insurance for patient liability ([phone]). Also provided my direct phone # ([phone]) in case patient had additional questions or wanted to schedule.

## 2025-05-21 ENCOUNTER — TELEPHONE (OUTPATIENT)
Dept: PRIMARY CARE | Facility: CLINIC | Age: 55
End: 2025-05-21
Payer: COMMERCIAL

## 2025-05-21 ENCOUNTER — HOSPITAL ENCOUNTER (OUTPATIENT)
Dept: RADIOLOGY | Facility: CLINIC | Age: 55
Discharge: HOME | End: 2025-05-21
Payer: COMMERCIAL

## 2025-05-21 VITALS — HEIGHT: 65 IN | BODY MASS INDEX: 28.87 KG/M2 | WEIGHT: 173.28 LBS

## 2025-05-21 DIAGNOSIS — Z12.31 ENCOUNTER FOR SCREENING MAMMOGRAM FOR MALIGNANT NEOPLASM OF BREAST: ICD-10-CM

## 2025-05-21 PROCEDURE — 77067 SCR MAMMO BI INCL CAD: CPT | Performed by: RADIOLOGY

## 2025-05-21 PROCEDURE — 77067 SCR MAMMO BI INCL CAD: CPT

## 2025-05-21 PROCEDURE — 77063 BREAST TOMOSYNTHESIS BI: CPT | Performed by: RADIOLOGY

## 2025-05-21 NOTE — TELEPHONE ENCOUNTER
Onset ongoing +1wk coughing w sputum.  2days of chest congestion.  Exhausted and needed nap yesterday.  No known fever.      Pt would like chest listened to; wants to avoid pneum./bronchitis.

## 2025-05-22 ENCOUNTER — APPOINTMENT (OUTPATIENT)
Dept: RADIOLOGY | Facility: CLINIC | Age: 55
End: 2025-05-22
Payer: COMMERCIAL

## 2025-05-23 ENCOUNTER — OFFICE VISIT (OUTPATIENT)
Dept: URGENT CARE | Age: 55
End: 2025-05-23
Payer: COMMERCIAL

## 2025-05-23 VITALS
RESPIRATION RATE: 18 BRPM | BODY MASS INDEX: 27.96 KG/M2 | WEIGHT: 168 LBS | TEMPERATURE: 97.8 F | OXYGEN SATURATION: 97 % | SYSTOLIC BLOOD PRESSURE: 143 MMHG | DIASTOLIC BLOOD PRESSURE: 85 MMHG | HEART RATE: 85 BPM

## 2025-05-23 DIAGNOSIS — B96.89 BACTERIAL URI: Primary | ICD-10-CM

## 2025-05-23 DIAGNOSIS — J06.9 BACTERIAL URI: Primary | ICD-10-CM

## 2025-05-23 PROCEDURE — 1036F TOBACCO NON-USER: CPT

## 2025-05-23 PROCEDURE — 99204 OFFICE O/P NEW MOD 45 MIN: CPT

## 2025-05-23 RX ORDER — METHYLPREDNISOLONE 4 MG/1
TABLET ORAL
Qty: 21 TABLET | Refills: 0 | Status: SHIPPED | OUTPATIENT
Start: 2025-05-23 | End: 2025-05-29

## 2025-05-23 RX ORDER — AMOXICILLIN 875 MG/1
875 TABLET, COATED ORAL 2 TIMES DAILY
Qty: 20 TABLET | Refills: 0 | Status: SHIPPED | OUTPATIENT
Start: 2025-05-23 | End: 2025-06-02

## 2025-05-23 ASSESSMENT — ENCOUNTER SYMPTOMS: COUGH: 1

## 2025-05-23 NOTE — PROGRESS NOTES
Subjective   Patient ID: Evi Higgins is a 54 y.o. female. They present today with a chief complaint of Cough (2 weeks ).    History of Present Illness    Cough        54-year-old female presents today with a congested cough x 2 weeks.  She endorses that the cough has been on and off, and it has progressively worsened.  She states that she has had postnasal drip as well.  She has been taking Claritin-D and using a nasal spray intermittently.  She denies any shortness of breath or chest pain, but she has experienced some increased fatigue.  She is here for evaluation.  Past Medical History  Allergies as of 05/23/2025 - Reviewed 05/23/2025   Allergen Reaction Noted    Ciprofloxacin Nausea Only, Other, Unknown, GI Upset, and Nausea/vomiting 05/20/2010    Clarithromycin Nausea Only, Other, Unknown, GI Upset, and Nausea/vomiting 05/20/2010    Erythromycin Nausea Only, Other, Unknown, GI Upset, and Nausea/vomiting 05/20/2010    Ofloxacin Nausea Only, Other, Unknown, and GI Upset 05/20/2010    Tetracycline Unknown 02/16/2023    Tetracyclines Unknown 02/16/2023       Prescriptions Prior to Admission[1]     Medical History[2]    Surgical History[3]     reports that she has never smoked. She has never used smokeless tobacco. She reports current alcohol use. She reports that she does not use drugs.    Review of Systems  Review of Systems   HENT:  Positive for congestion.    Respiratory:  Positive for cough.                                   Objective    Vitals:    05/23/25 1912   BP: 143/85   Pulse: 85   Resp: 18   Temp: 36.6 °C (97.8 °F)   SpO2: 97%   Weight: 76.2 kg (168 lb)     Patient's last menstrual period was 04/01/2022.    Physical Exam  HENT:      Left Ear: A middle ear effusion is present.      Ears:      Comments: With infection  Cardiovascular:      Rate and Rhythm: Normal rate and regular rhythm.      Pulses: Normal pulses.      Heart sounds: Normal heart sounds.   Pulmonary:      Effort: Pulmonary effort is  normal.      Breath sounds: Normal breath sounds.         Procedures    Point of Care Test & Imaging Results from this visit  No results found for this visit on 05/23/25.   Imaging  No results found.    Cardiology, Vascular, and Other Imaging  No other imaging results found for the past 2 days      Diagnostic study results (if any) were reviewed by CLAY Muhammad.    Assessment/Plan   Allergies, medications, history, and pertinent labs/EKGs/Imaging reviewed by CLAY Muhammad.     Medical Decision Making  Left otitis media noted upon physical examination; patient is agreeable to oral antibiotic, and a Medrol Dosepak to help with her cough. As a result of the work-up, the patient was discharged home.  she was informed of her diagnosis and instructed to come back with any concerns or worsening of condition.  she and was agreeable to the plan as discussed above.  she was given the opportunity to ask questions.  All of the patient's questions were answered.      Orders and Diagnoses  Diagnoses and all orders for this visit:  Bacterial URI  -     amoxicillin (Amoxil) 875 mg tablet; Take 1 tablet (875 mg) by mouth 2 times a day for 10 days.  -     methylPREDNISolone (Medrol Dospak) 4 mg tablets; Follow schedule on package instructions      Medical Admin Record      Patient disposition: Home    Electronically signed by CLAY Muhammad  7:23 PM           [1] (Not in a hospital admission)   [2]   Past Medical History:  Diagnosis Date    Abdominal pain 02/16/2023    Abnormal finding on mammography 02/16/2023    Abnormal uterine bleeding (AUB) 02/16/2023    Acute bronchitis 02/16/2023    Acute respiratory disease 02/16/2023    Acute URI 02/16/2023    Allergic Pollon as a child    Anxiety Recently on/off    Atopic dermatitis 02/16/2023    Calculus of kidney     Calcium kidney stones    Constipation 02/16/2023    Cough 02/16/2023    Dermatitis 02/16/2023    Disorder of  lipoprotein metabolism, unspecified     Borderline high cholesterol    Diverticulosis 8 years ago    Dysuria 02/16/2023    Encounter for pregnancy test, result negative     Pregnancy examination or test, negative result    Encounter for screening for malignant neoplasm of cervix 03/07/2022    Encounter for screening for malignant neoplasm of cervix    Hematuria 02/16/2023    History of benign breast biopsy 02/16/2023    Left knee pain 02/16/2023    Liver lesion 02/16/2023    Mastodynia 05/01/2018    Painful lumpy right breast    Menometrorrhagia 02/16/2023    Microscopic hematuria 02/16/2023    Missed menses 02/16/2023    Multiple births, unspecified, all liveborn (HHS-HCC)     Multiple births, all liveborn    Other conditions influencing health status     Menstruation    Other specified disorders of breast     Pseudoangiomatous stromal hyperplasia of breast    Pain of finger 02/16/2023    Pelvic pain 02/16/2023    Personal history of other diseases of the female genital tract 01/21/2019    History of breast pain    Personal history of other diseases of the respiratory system 11/15/2017    History of acute sinusitis    Personal history of other infectious and parasitic diseases     History of varicella    Personal history of other medical treatment     History of mammogram    Pharyngitis 02/16/2023    Renal calculi 02/16/2023    Right knee pain 02/16/2023    Snoring 2 years ago    Thumb pain 02/16/2023    Unspecified disorder of nose and nasal sinuses     Sinus problem    Urgency of urination     Urinary urgency    Varicella A child    Yeast infection 02/16/2023   [3]   Past Surgical History:  Procedure Laterality Date    BREAST BIOPSY Right 12/18/2017    Biopsy Breast Percutaneous Needle Core    COLONOSCOPY  04/19/2013    Complete Colonoscopy    COLONOSCOPY  04/04/2014    Colonoscopy (Fiberoptic)    MOUTH SURGERY  04/04/2014    Oral Surgery Tooth Extraction    OTHER SURGICAL HISTORY  03/10/2014    Dental Surgery

## 2025-05-27 ENCOUNTER — APPOINTMENT (OUTPATIENT)
Dept: OBSTETRICS AND GYNECOLOGY | Facility: CLINIC | Age: 55
End: 2025-05-27
Payer: COMMERCIAL

## 2025-05-27 VITALS
WEIGHT: 169.4 LBS | DIASTOLIC BLOOD PRESSURE: 84 MMHG | SYSTOLIC BLOOD PRESSURE: 130 MMHG | HEIGHT: 65 IN | BODY MASS INDEX: 28.22 KG/M2

## 2025-05-27 DIAGNOSIS — Z12.31 ENCOUNTER FOR SCREENING MAMMOGRAM FOR BREAST CANCER: ICD-10-CM

## 2025-05-27 DIAGNOSIS — Z01.419 ENCOUNTER FOR WELL WOMAN EXAM WITH ROUTINE GYNECOLOGICAL EXAM: Primary | ICD-10-CM

## 2025-05-27 PROCEDURE — 3008F BODY MASS INDEX DOCD: CPT | Performed by: OBSTETRICS & GYNECOLOGY

## 2025-05-27 PROCEDURE — 1036F TOBACCO NON-USER: CPT | Performed by: OBSTETRICS & GYNECOLOGY

## 2025-05-27 PROCEDURE — 99396 PREV VISIT EST AGE 40-64: CPT | Performed by: OBSTETRICS & GYNECOLOGY

## 2025-05-27 NOTE — PROGRESS NOTES
ASSESSMENT/PLAN    1. Encounter for well woman exam with routine gynecological exam (Primary)  Routine well woman visit.   Your exam was normal today.   A pap test was not done.   Last pap  2022   was normal and human papilloma virus negative. Recommendation for paps in your age category is every 5 years. Yearly GYN visits are still recommended.      2. Encounter for screening mammogram for breast cancer  Your clinical breast exam was normal.   A screening mammogram order has been placed for 5/2026. Reviewed your recent normal 5/2025 mammogram.       ---------------------------------------------------------------------------------------------------    SUBJECTIVE      PAP 3-7-22 NEG, HPV NEG  MAMMO 5-  DEXA 4-4-2024 T=-1.7  COLON 7-28-16 Normal - 10 yrs.          HPI    54-year-old here for routine well woman visit.   Last visit 3/2024.   Menopausal since age 51.   Up to date with Dr. Behm.   Has a cough. Went to urgent care. Dxed with fluid on lung. Given amoxicillin, steroids.   , no smoke, no ETOH.  2 kids. One dtr graduated last year, working at [employer] . 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     REVIEW OF SYSTEMS    Constitutional: no recent weight gain, no fatigue.   ENT: no hearing loss.  Cardiovascular: no chest pain, no palpitations.  Respiratory: no shortness of breath.  Gastrointestinal: no abdominal pain, no constipation, no nausea, no diarrhea.  Musculoskeletal: no back pain.  Lymphatic: no swollen glands.  Genitourinary: no pelvic pain, no urinary urgency, no urinary incontinence, no change in urinary frequency, no vaginal dryness, no vaginal itching,  no vaginal discharge, no unexplained vaginal bleeding, no lesion/sore.   Breasts: no breast pain, no nipple discharge, no breast lump.   Neurological: no headache, no numbness, no dizziness.   Psychiatric: no sleep disturbances, no anxiety, no depression.   Endocrine: no hot flashes, no loss of hair, no  "hirsutism.       OBJECTIVE    /84   Ht 1.651 m (5' 5\")   Wt 76.8 kg (169 lb 6.4 oz)   LMP 04/01/2022   BMI 28.19 kg/m²      Physical Exam     Constitutional: Alert and in no acute distress. Well developed, well nourished   Head and Face: Head and face: normal   Eyes: Normal external exam - nonicteric sclera.  Ears, Nose, Mouth, and Throat: External inspection of ears and nose: normal   Neck: no neck asymmetry. Supple and thyroid not enlarged and there were no palpable thyroid nodules   Cardiovascular: Heart rate and rhythm were normal  Pulmonary: No respiratory distress and clear bilateral breath sounds   Chest: Breasts: normal appearance, no nipple discharge, no skin changes. Palpation of breasts and axillae: no palpable mass, no axillary lymphadenopathy   Abdomen: soft nontender; no abdominal mass palpated, no organomegaly and no hernias   Genitourinary: external genitalia: normal appearing vulva and labia without lesions. No inguinal lymphadenopathy,    Urethra: normal.   Bladder: normal on palpation.   Perianal area: normal   Vagina: normal, without significant discharge, no lesions.  Cervix: Normal appearing without lesions.  Uterus: Normal, mobile, nontender.  Right and left adnexa/parametria: Normal  Skin: normal skin color and pigmentation, normal skin turgor, and no rash  Psychiatric: alert and oriented x 3., affect normal to patient baseline and mood: appropriate        Juliann Sheldon MD    "

## 2025-05-29 ENCOUNTER — TELEPHONE (OUTPATIENT)
Dept: PRIMARY CARE | Facility: CLINIC | Age: 55
End: 2025-05-29

## 2025-05-30 ENCOUNTER — OFFICE VISIT (OUTPATIENT)
Dept: PRIMARY CARE | Facility: CLINIC | Age: 55
End: 2025-05-30
Payer: COMMERCIAL

## 2025-05-30 VITALS
WEIGHT: 170.2 LBS | DIASTOLIC BLOOD PRESSURE: 76 MMHG | SYSTOLIC BLOOD PRESSURE: 122 MMHG | HEIGHT: 64 IN | BODY MASS INDEX: 29.06 KG/M2 | HEART RATE: 60 BPM | RESPIRATION RATE: 20 BRPM | TEMPERATURE: 97.8 F | OXYGEN SATURATION: 97 %

## 2025-05-30 DIAGNOSIS — J06.9 UPPER RESPIRATORY TRACT INFECTION, UNSPECIFIED TYPE: Primary | ICD-10-CM

## 2025-05-30 PROCEDURE — 1036F TOBACCO NON-USER: CPT | Performed by: NURSE PRACTITIONER

## 2025-05-30 PROCEDURE — 3008F BODY MASS INDEX DOCD: CPT | Performed by: NURSE PRACTITIONER

## 2025-05-30 PROCEDURE — 99213 OFFICE O/P EST LOW 20 MIN: CPT | Performed by: NURSE PRACTITIONER

## 2025-05-30 RX ORDER — FLUTICASONE PROPIONATE 50 MCG
1 SPRAY, SUSPENSION (ML) NASAL DAILY
Qty: 16 G | Refills: 5 | Status: SHIPPED | OUTPATIENT
Start: 2025-05-30 | End: 2026-05-30

## 2025-06-13 ENCOUNTER — CLINICAL SUPPORT (OUTPATIENT)
Dept: SLEEP MEDICINE | Facility: CLINIC | Age: 55
End: 2025-06-13
Payer: COMMERCIAL

## 2025-06-13 DIAGNOSIS — R06.83 SNORING: ICD-10-CM

## 2025-06-13 DIAGNOSIS — R29.818 SUSPECTED SLEEP APNEA: ICD-10-CM

## 2025-06-14 ENCOUNTER — APPOINTMENT (OUTPATIENT)
Dept: SLEEP MEDICINE | Facility: CLINIC | Age: 55
End: 2025-06-14
Payer: COMMERCIAL

## 2025-06-14 VITALS
HEIGHT: 64 IN | SYSTOLIC BLOOD PRESSURE: 122 MMHG | BODY MASS INDEX: 29.06 KG/M2 | WEIGHT: 170.19 LBS | DIASTOLIC BLOOD PRESSURE: 76 MMHG

## 2025-06-14 ASSESSMENT — SLEEP AND FATIGUE QUESTIONNAIRES
HOW LIKELY ARE YOU TO NOD OFF OR FALL ASLEEP WHILE LYING DOWN TO REST IN THE AFTERNOON WHEN CIRCUMSTANCES PERMIT: HIGH CHANCE OF DOZING
HOW LIKELY ARE YOU TO NOD OFF OR FALL ASLEEP WHILE SITTING QUIETLY AFTER LUNCH WITHOUT ALCOHOL: SLIGHT CHANCE OF DOZING
HOW LIKELY ARE YOU TO NOD OFF OR FALL ASLEEP WHILE WATCHING TV: MODERATE CHANCE OF DOZING
ESS-CHAD TOTAL SCORE: 12
HOW LIKELY ARE YOU TO NOD OFF OR FALL ASLEEP IN A CAR, WHILE STOPPED FOR A FEW MINUTES IN TRAFFIC: WOULD NEVER DOZE
SITING INACTIVE IN A PUBLIC PLACE LIKE A CLASS ROOM OR A MOVIE THEATER: SLIGHT CHANCE OF DOZING
HOW LIKELY ARE YOU TO NOD OFF OR FALL ASLEEP WHILE SITTING AND TALKING TO SOMEONE: SLIGHT CHANCE OF DOZING
HOW LIKELY ARE YOU TO NOD OFF OR FALL ASLEEP WHEN YOU ARE A PASSENGER IN A CAR FOR AN HOUR WITHOUT A BREAK: SLIGHT CHANCE OF DOZING
HOW LIKELY ARE YOU TO NOD OFF OR FALL ASLEEP WHILE SITTING AND READING: HIGH CHANCE OF DOZING

## 2025-06-14 NOTE — PROGRESS NOTES
[facility] TECH NOTE:     Patient: Evi Higgins   MRN//AGE: [id number]  1970  54 y.o.   Technologist: Vikas MORSE   Room: 2   Service Date: 2025        Sleep Testing Location: [facility]    [place]: 12    TECHNOLOGIST SLEEP STUDY PROCEDURE NOTE:   This sleep study is being conducted according to the policies and procedures outlined by the AAS accreditation standards.  The sleep study procedure and processes involved during this appointment was explained to the patient/patient’s family, questions were answered. The patient/family verbalized understanding.      The patient is a 54 y.o. year old female scheduled for adiagnostic PSG with montage of: PSG. she arrived for her appointment.      The study that was ultimately completed was adiagnostic PSG with montage of: PSG.    The full study Was completed.  Patient questionnaires completed?: yes     Consents signed? yes    Initial Fall Risk Screening:     Evi has not fallen in the last 6 months. her did not result in injury. Evi does not have a fear of falling. He does not need assistance with sitting, standing, or walking. she does not need assistance walking in her home. she does not need assistance in an unfamiliar setting. The patient is notusing an assistive device.     Brief Study observations:  53 y/o female patient presents for a diagnostic sleep study. She arrived to the lab by herself at 8:00 PM. Procedures were explained to the patient and she expressed understanding. She was pleasant and cooperative throughout the procedure.   Split night protocols were not met due to low AHI.    Deviation to order/protocol and reason: NA      If PAP, which was preferred mask/pressure/mode: NA      Other:None    After the procedure, the patient/family was informed to ensure followup with ordering clinician for testing results.      Technologist: Vikas MORSE

## 2025-06-23 ENCOUNTER — TELEPHONE (OUTPATIENT)
Dept: SLEEP MEDICINE | Facility: HOSPITAL | Age: 55
End: 2025-06-23
Payer: COMMERCIAL

## 2025-06-23 DIAGNOSIS — G47.33 OSA (OBSTRUCTIVE SLEEP APNEA): ICD-10-CM

## 2025-06-23 NOTE — TELEPHONE ENCOUNTER
Called patient and left VM regarding the availability of sleep test result.  Sleep nurse phone number [phone] - given to call back for results and plan going forward.

## 2025-06-23 NOTE — TELEPHONE ENCOUNTER
----- Message from Brendan Mas sent at 6/19/2025  1:41 PM EDT -----  Regarding: Sleep study results    Reji Cat/Nessa - can you call this patient about their home sleep apnea test (HSAT) and tell them they have Severe KORTNEY for which we would recommend starting APAP? If they agree lets order APAP at   5-15 cwp via [facility]. If they do not agree I would would recommend they come in for a followup to discuss options.    Thanks,  Brendan  ----- Message -----  From: Uriah Garcia - Lab Results In  Sent: 6/18/2025   9:56 PM EDT  To: Brendan Mas MD PhD

## 2025-06-25 NOTE — TELEPHONE ENCOUNTER
Patient returned call regarding the sleep study result. Patient agreeable to start APAP therapy with [facility]. Patient scheduled for a follow-up appointment with Dr. Mas between 30-90 days after CPAP initiation on 9/9/2025. Patient verbalized understanding, all questions answered.

## 2025-06-25 NOTE — TELEPHONE ENCOUNTER
Called patient and left VM regarding the availability of sleep test result.  Sleep nurse phone number [phone] - given to call back for results and plan going forward.

## 2025-08-09 DIAGNOSIS — E78.5 HYPERLIPIDEMIA, UNSPECIFIED HYPERLIPIDEMIA TYPE: ICD-10-CM

## 2025-08-13 ENCOUNTER — TELEPHONE (OUTPATIENT)
Dept: PRIMARY CARE | Facility: CLINIC | Age: 55
End: 2025-08-13
Payer: COMMERCIAL

## 2025-09-02 ENCOUNTER — APPOINTMENT (OUTPATIENT)
Dept: SLEEP MEDICINE | Facility: HOSPITAL | Age: 55
End: 2025-09-02
Payer: COMMERCIAL

## 2025-09-09 ENCOUNTER — APPOINTMENT (OUTPATIENT)
Dept: SLEEP MEDICINE | Facility: HOSPITAL | Age: 55
End: 2025-09-09
Payer: COMMERCIAL

## 2025-09-12 ENCOUNTER — APPOINTMENT (OUTPATIENT)
Dept: PRIMARY CARE | Facility: CLINIC | Age: 55
End: 2025-09-12
Payer: COMMERCIAL

## 2025-10-10 ENCOUNTER — APPOINTMENT (OUTPATIENT)
Dept: PRIMARY CARE | Facility: CLINIC | Age: 55
End: 2025-10-10
Payer: COMMERCIAL

## 2025-10-28 ENCOUNTER — APPOINTMENT (OUTPATIENT)
Dept: SLEEP MEDICINE | Facility: HOSPITAL | Age: 55
End: 2025-10-28
Payer: COMMERCIAL

## 2026-01-28 ENCOUNTER — APPOINTMENT (OUTPATIENT)
Dept: SLEEP MEDICINE | Facility: CLINIC | Age: 56
End: 2026-01-28
Payer: COMMERCIAL

## 2026-05-11 ENCOUNTER — APPOINTMENT (OUTPATIENT)
Dept: PRIMARY CARE | Facility: CLINIC | Age: 56
End: 2026-05-11
Payer: COMMERCIAL